# Patient Record
Sex: MALE | Race: WHITE | NOT HISPANIC OR LATINO | Employment: OTHER | URBAN - METROPOLITAN AREA
[De-identification: names, ages, dates, MRNs, and addresses within clinical notes are randomized per-mention and may not be internally consistent; named-entity substitution may affect disease eponyms.]

---

## 2022-01-18 NOTE — PROGRESS NOTES
[facility] Dermatology Note  Encounter Date: Jan 19, 2022  Office Visit     Dermatology Problem List:  Last skin check 1/19/22, recommended yearly  1. NUB - right upper back, bx 1/19/22    Social history: Grew up in [place]. 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  Family history: Father may have had skin cancer.  ____________________________________________    Assessment & Plan:    # Sun damaged skin with solar lentigines: Chronic, stable.  - Recommend sunscreens SPF #30 or greater, protective clothing and avoidance of tanning beds.    # Benign skin findings including: seborrheic keratoses, cherry angioma, skin tags - minor, self limited problem  - No further intervention required. Patient to report changes.   - Patient reassured of the benign nature of these lesions.    # Multiple clinically benign nevi: Chronic, stable  - No further intervention required. Patient to report changes.   - Patient reassured of the benign nature of these lesions.    # Neoplasm of uncertain behavior on the right upper back. The differential diagnosis includes BCC vs lentigo vs other.   - See shave biopsy procedure note below.    Procedures Performed:   - Shave biopsy procedure note, location: right upper back. After discussion of benefits and risks including but not limited to bleeding, infection, scar, incomplete removal, recurrence, and non-diagnostic biopsy, written consent and photographs were obtained. The area was cleaned with isopropyl alcohol. 0.5mL of 1% lidocaine with epinephrine was injected to obtain adequate anesthesia of lesion. Shave biopsy at site performed. Hemostasis was achieved with aluminium chloride. Petrolatum ointment and a sterile dressing were applied. The patient tolerated the procedure and no complications were noted. The patient was provided with verbal and written post care instructions.     Follow-up: pending path results, 1 year  in-person for skin check, or earlier for new or changing lesions.    Staff and Scribe:     Scribe Disclosure:   I, oRsalinda Chavarria, am serving as a scribe to document services personally performed by this physician, Dr. Elizabeth Herrera, based on data collection and the provider's statements to me.     Provider Disclosure:   The documentation recorded by the scribe accurately reflects the services I personally performed and the decisions made by me.    Elizabeth Herrera MD    Department of Dermatology  [facility]: Phone: [phone], Fax:[phone]  [facility]: Phone: [phone], Fax: [phone]    ____________________________________________    CC: Derm Problem (FBSC, spot around neck, skin tag on back, dry patches on scalp, dad possibly had skin cancer)    HPI:  Mr. Dez Mullen is a(n) 61 year old male who presents today as a new patient for skin check.    He is new to our department. He has not seen a dermatologist prior. He has no history of skin cancer, atypical moles, or precancerous lesions to his knowledge.    Self referred.    Today, Dez is accompanied by his wife. Dez notes concern of a spot on the neck, a lesion on the back, a lesion that patient picks off then grows back that is slightly elevated, and dry patches on the scalp.    Patient is otherwise feeling well, without additional skin concerns.    Labs Reviewed:  N/A    Physical Exam:  Vitals: There were no vitals taken for this visit.  SKIN: Total skin excluding the undergarment areas was performed. The exam included the head/face, neck, both arms, chest, back, abdomen, buttocks, both legs, digits and/or nails. Declined genital exam.  - Rendon Type II  - Scattered brown macules on sun exposed areas.  - There are dome shaped bright red papules on the trunk.   - Multiple regular brown pigmented macules and  papules are identified on the trunk and extremities.   - There are waxy stuck on tan to brown papules on the trunk.  - Right upper back: 3mm brown macule with some clod and clod pigmentation  - There are skin colored pedunculated papules around the neck.  - No other lesions of concern on areas examined.     Medications:  Current Outpatient Medications   Medication     MULTIPLE VITAMIN PO     Omega-3 Fatty Acids (OMEGA 3 PO)     No current facility-administered medications for this visit.      Past Medical History:   Patient Active Problem List   Diagnosis   (none) - all problems resolved or deleted     No past medical history on file.     CC Referred Self, MD  No address on file on close of this encounter.

## 2022-01-19 ENCOUNTER — OFFICE VISIT (OUTPATIENT)
Dept: DERMATOLOGY | Facility: CLINIC | Age: 62
End: 2022-01-19
Payer: MEDICARE

## 2022-01-19 DIAGNOSIS — L57.8 SUN-DAMAGED SKIN: ICD-10-CM

## 2022-01-19 DIAGNOSIS — L82.1 SEBORRHEIC KERATOSIS: ICD-10-CM

## 2022-01-19 DIAGNOSIS — D22.9 MULTIPLE BENIGN NEVI: ICD-10-CM

## 2022-01-19 DIAGNOSIS — L81.4 SOLAR LENTIGO: ICD-10-CM

## 2022-01-19 DIAGNOSIS — D48.5 NEOPLASM OF UNCERTAIN BEHAVIOR OF SKIN: Primary | ICD-10-CM

## 2022-01-19 DIAGNOSIS — L91.8 SKIN TAG: ICD-10-CM

## 2022-01-19 DIAGNOSIS — D18.01 CHERRY ANGIOMA: ICD-10-CM

## 2022-01-19 PROCEDURE — 99203 OFFICE O/P NEW LOW 30 MIN: CPT | Mod: 25 | Performed by: DERMATOLOGY

## 2022-01-19 PROCEDURE — 88305 TISSUE EXAM BY PATHOLOGIST: CPT | Performed by: DERMATOLOGY

## 2022-01-19 PROCEDURE — 11102 TANGNTL BX SKIN SINGLE LES: CPT | Performed by: DERMATOLOGY

## 2022-01-19 NOTE — LETTER
1/19/2022         RE: Dez Mullen  [address] [facility] [address]        Dear Colleague,    Thank you for referring your patient, Dez Mullen, to the [facility]. Please see a copy of my visit note below.    [facility] Dermatology Note  Encounter Date: Jan 19, 2022  Office Visit     Dermatology Problem List:  Last skin check 1/19/22, recommended yearly  1. NUB - right upper back, bx 1/19/22    Social history: Grew up in [place]. 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  Family history: Father may have had skin cancer.  ____________________________________________    Assessment & Plan:    # Sun damaged skin with solar lentigines: Chronic, stable.  - Recommend sunscreens SPF #30 or greater, protective clothing and avoidance of tanning beds.    # Benign skin findings including: seborrheic keratoses, cherry angioma, skin tags - minor, self limited problem  - No further intervention required. Patient to report changes.   - Patient reassured of the benign nature of these lesions.    # Multiple clinically benign nevi: Chronic, stable  - No further intervention required. Patient to report changes.   - Patient reassured of the benign nature of these lesions.    # Neoplasm of uncertain behavior on the right upper back. The differential diagnosis includes BCC vs lentigo vs other.   - See shave biopsy procedure note below.    Procedures Performed:   - Shave biopsy procedure note, location: right upper back. After discussion of benefits and risks including but not limited to bleeding, infection, scar, incomplete removal, recurrence, and non-diagnostic biopsy, written consent and photographs were obtained. The area was cleaned with isopropyl alcohol. 0.5mL of 1% lidocaine with epinephrine was injected to obtain adequate anesthesia of lesion. Shave biopsy at site performed. Hemostasis was achieved with  aluminium chloride. Petrolatum ointment and a sterile dressing were applied. The patient tolerated the procedure and no complications were noted. The patient was provided with verbal and written post care instructions.     Follow-up: pending path results, 1 year in-person for skin check, or earlier for new or changing lesions.    Staff and Scribe:     Scribe Disclosure:   I, Rosalindamary Chavarria, am serving as a scribe to document services personally performed by this physician, Dr. Elizabeth Herrera, based on data collection and the provider's statements to me.     Provider Disclosure:   The documentation recorded by the scribe accurately reflects the services I personally performed and the decisions made by me.    Elizabeth Herrera MD    Department of Dermatology  [facility]: Phone: [phone], Fax:[phone]  [facility]: Phone: [phone], Fax: [phone]    ____________________________________________    CC: Derm Problem (FBSC, spot around neck, skin tag on back, dry patches on scalp, dad possibly had skin cancer)    HPI:  Mr. Dez Mullen is a(n) 61 year old male who presents today as a new patient for skin check.    He is new to our department. He has not seen a dermatologist prior. He has no history of skin cancer, atypical moles, or precancerous lesions to his knowledge.    Self referred.    Today, Dez is accompanied by his wife. Dez notes concern of a spot on the neck, a lesion on the back, a lesion that patient picks off then grows back that is slightly elevated, and dry patches on the scalp.    Patient is otherwise feeling well, without additional skin concerns.    Labs Reviewed:  N/A    Physical Exam:  Vitals: There were no vitals taken for this visit.  SKIN: Total skin excluding the undergarment areas was performed. The exam included the head/face, neck, both arms,  chest, back, abdomen, buttocks, both legs, digits and/or nails. Declined genital exam.  - Rendon Type II  - Scattered brown macules on sun exposed areas.  - There are dome shaped bright red papules on the trunk.   - Multiple regular brown pigmented macules and papules are identified on the trunk and extremities.   - There are waxy stuck on tan to brown papules on the trunk.  - Right upper back: 3mm brown macule with some clod and clod pigmentation  - There are skin colored pedunculated papules around the neck.  - No other lesions of concern on areas examined.     Medications:  Current Outpatient Medications   Medication     MULTIPLE VITAMIN PO     Omega-3 Fatty Acids (OMEGA 3 PO)     No current facility-administered medications for this visit.      Past Medical History:   Patient Active Problem List   Diagnosis   (none) - all problems resolved or deleted     No past medical history on file.     CC Referred Self, MD  No address on file on close of this encounter.       Again, thank you for allowing me to participate in the care of your patient.        Sincerely,        Elizabeth Herrera MD

## 2022-01-19 NOTE — NURSING NOTE
The following medication was given:     MEDICATION:  Lidocaine with epinephrine 1% 1:[id number]  ROUTE: [place]  SITE: see procedure note  DOSE: see procedure note  LOT #: -[id number]  : Crisira  EXPIRATION DATE: 6/1/2022  NDC#: 2069-1852-99     Was there drug waste? Yes    Multi-dose vial: Yes    Dominga Nolen LPN  January 19, 2022

## 2022-01-19 NOTE — PATIENT INSTRUCTIONS

## 2022-01-19 NOTE — NURSING NOTE
Dez Mullen's goals for this visit include:   Chief Complaint   Patient presents with     Derm Problem     FBSC, spot around neck, skin tag on back, dry patches on scalp, dad possibly had skin cancer       He requests these members of his care team be copied on today's visit information: no    PCP: No Ref-Primary, Physician    Referring Provider:  Referred Self, MD  No address on file    There were no vitals taken for this visit.    Do you need any medication refills at today's visit? No    Dominga Nolen LPN

## 2022-01-22 LAB
PATH REPORT.COMMENTS IMP SPEC: ABNORMAL
PATH REPORT.COMMENTS IMP SPEC: ABNORMAL
PATH REPORT.COMMENTS IMP SPEC: YES
PATH REPORT.FINAL DX SPEC: ABNORMAL
PATH REPORT.GROSS SPEC: ABNORMAL
PATH REPORT.MICROSCOPIC SPEC OTHER STN: ABNORMAL
PATH REPORT.RELEVANT HX SPEC: ABNORMAL

## 2022-01-25 ENCOUNTER — TELEPHONE (OUTPATIENT)
Dept: DERMATOLOGY | Facility: CLINIC | Age: 62
End: 2022-01-25
Payer: MEDICARE

## 2022-01-25 NOTE — TELEPHONE ENCOUNTER
Elzbieta Wick RN   1/25/2022  2:23 PM CST Back to Top        Results reviewed with Don.  He opted for ED&C.  Appt scheduled 1/27/22.  GIANNA Batres RN   1/25/2022  2:13 PM CST         Pt called, No answer.  does not identify pt. Left general message for pt to call the [facility] back at [phone]. ..Merry Herrera MD   1/25/2022 12:10 PM CST         Please let patient know biopsy showed BCC. This is his first skin cancer. Options for treatment are 1) efudex 5% cream once daily x6 weeks with recheck to ensure adequately treated in 3 months or 2) ED&C in office. See which patient prefers. If efudex, send to RN to place rx.     Next skin check in 6 months.     Final Diagnosis   A. 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

## 2022-01-25 NOTE — TELEPHONE ENCOUNTER
ED&C Scheduling    Explain diagnosis: yes    Explain procedure and outcome of wound/scar will appear like a gray-kristan cigar burn: Yes    Inform patient no tissue is sent to pathologist for testing margins: Yes    Inform patient no submerging the area in a bathtub, hot tub, lake or swimming pool until completely healed: Yes    After care explanation of Vaseline and bandage until completely healed: Yes    Elzbieta Wick RN

## 2022-01-27 ENCOUNTER — TELEPHONE (OUTPATIENT)
Dept: DERMATOLOGY | Facility: CLINIC | Age: 62
End: 2022-01-27

## 2022-01-27 NOTE — TELEPHONE ENCOUNTER
[facility] Call Center    Phone Message    May a detailed message be left on voicemail: yes     Reason for Call: Appointment Intake    Referring Provider Name: SPENCER  Diagnosis and/or Symptoms: ED&C - BCC right upper back    Pt is ill and needed to cancel Appt for today 01/27 and will need to reschedule this Appt     Please call Pt's spouse to reschedule [place] [phone]  Thanks    Action Taken: Message routed to:  [facility] Derm    Travel Screening: Not Applicable

## 2022-01-27 NOTE — TELEPHONE ENCOUNTER
Patient has been rescheduled to 2/3/2022.   CVA (cerebrovascular accident) CVA (cerebrovascular accident) CVA (cerebrovascular accident)

## 2022-02-03 ENCOUNTER — OFFICE VISIT (OUTPATIENT)
Dept: DERMATOLOGY | Facility: CLINIC | Age: 62
End: 2022-02-03
Payer: MEDICARE

## 2022-02-03 DIAGNOSIS — C44.519 BCC (BASAL CELL CARCINOMA), TRUNK: Primary | ICD-10-CM

## 2022-02-03 PROCEDURE — 99213 OFFICE O/P EST LOW 20 MIN: CPT | Performed by: DERMATOLOGY

## 2022-02-03 RX ORDER — ONDANSETRON 8 MG/1
TABLET, ORALLY DISINTEGRATING ORAL
COMMUNITY
Start: 2021-04-13

## 2022-02-03 RX ORDER — FLUOROURACIL 50 MG/G
CREAM TOPICAL
Qty: 40 G | Refills: 0 | Status: SHIPPED | OUTPATIENT
Start: 2022-02-03

## 2022-02-03 RX ORDER — CEFUROXIME AXETIL 250 MG/1
TABLET ORAL
COMMUNITY

## 2022-02-03 RX ORDER — UBROGEPANT 50 MG/1
TABLET ORAL
COMMUNITY
Start: 2022-01-17

## 2022-02-03 NOTE — LETTER
2/3/2022         RE: [facility]  [address] [facility] [facility] [address]        Dear Colleague,    Thank you for referring your patient, Dez Mullen, to the [facility]. Please see a copy of my visit note below.    [facility] Dermatology Note  Encounter Date: Feb 3, 2022  Office Visit     Dermatology Problem List:  Last skin check 1/19/22, recommended yearly  1. History of NMSC  - BCC right upper back, s/p bx 1/19/22, Efudex initiated 2/3/22     Social history: Grew up in [place]. 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  Family history: Father may have had skin cancer.  _______________________________    Assessment & Plan:     # Biopsy proven BCC, right upper back.  - Patient initially presented for ED&C treatment today. We reviewed treatment options of ED&C and Efudex. Risks and benefits of both options explained in detail. Patient elects to pursue Efudex.  - Initiate Efudex 5% cream applied to the right upper back once daily x 6 weeks. Discussed that patient should expect redness, blistering and scabbing as well as time course discussed. Informed patient to keep away from children/pets and to wash hands after application.    Procedures Performed:   None.    Follow-up: 3 months for recheck, sooner for concerns.    Staff and Scribe:     Scribe Disclosure:   I, Rosalinda Chavarria, am serving as a scribe to document services personally performed by this physician, Dr. Elizabeth Herrera, based on data collection and the provider's statements to me.     Provider Disclosure:   The documentation recorded by the scribe accurately reflects the services I personally performed and the decisions made by me.    Elizabeth Herrera MD    Department of Dermatology  [facility]: Phone: [phone], Fax:[phone]  [facility]  [facility]: Phone: [phone], Fax: [phone]    ____________________________________________    CC: Procedure (ED&C-right upper back)    HPI:  Mr. Dez Mullen is a(n) 61 year old male who presents today as a return patient for BCC treatment.    Last seen 1/9/22. A biopsy determined a BCC on the right upper back.    Today, Dez presents for ED&C treatment of BCC on the right upper back. He has a lot of questions and is hesitant for the procedure.    Patient is otherwise feeling well, without additional skin concerns.     Labs Reviewed:  N/A    Physical Exam:  Vitals: There were no vitals taken for this visit.  SKIN: Focused examination of the upper back was performed.  - Right upper back: pink macule consistent with scar  - No other lesions of concern on areas examined.     Medications:  Current Outpatient Medications   Medication     fluorouracil (EFUDEX) 5 % external cream     MULTIPLE VITAMIN PO     Omega-3 Fatty Acids (OMEGA 3 PO)     ondansetron (ZOFRAN-ODT) 8 MG ODT tab     SUMAtriptan (IMITREX STATDOSE) 6 MG/0.5ML pen injector kit     UBRELVY 50 MG tablet     No current facility-administered medications for this visit.      Past Medical History:   Patient Active Problem List   Diagnosis   (none) - all problems resolved or deleted     No past medical history on file.    CC No referring provider defined for this encounter. on close of this encounter.          Again, thank you for allowing me to participate in the care of your patient.        Sincerely,        Elizabeth Herrera MD

## 2022-02-03 NOTE — NURSING NOTE
Dez Mullen's goals for this visit include:   Chief Complaint   Patient presents with     Procedure     ED&C-right upper back       He requests these members of his care team be copied on today's visit information:     PCP: No Ref-Primary, Physician    Referring Provider:  No referring provider defined for this encounter.    There were no vitals taken for this visit.    Do you need any medication refills at today's visit? Cheyenne Lenz on 2/3/2022 at 1:59 PM

## 2022-02-03 NOTE — PROGRESS NOTES
[facility] Dermatology Note  Encounter Date: Feb 3, 2022  Office Visit     Dermatology Problem List:  Last skin check 1/19/22, recommended yearly  1. History of NMSC  - BCC right upper back, s/p bx 1/19/22, Efudex initiated 2/3/22     Social history: Grew up in [place]. 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  Family history: Father may have had skin cancer.  _______________________________    Assessment & Plan:     # Biopsy proven BCC, right upper back.  - Patient initially presented for ED&C treatment today. We reviewed treatment options of ED&C and Efudex. Risks and benefits of both options explained in detail. Patient elects to pursue Efudex.  - Initiate Efudex 5% cream applied to the right upper back once daily x 6 weeks. Discussed that patient should expect redness, blistering and scabbing as well as time course discussed. Informed patient to keep away from children/pets and to wash hands after application.    Procedures Performed:   None.    Follow-up: 3 months for recheck, sooner for concerns.    Staff and Scribe:     Scribe Disclosure:   I, Rosalinda Chavarria, am serving as a scribe to document services personally performed by this physician, Dr. Elizabeth Herrera, based on data collection and the provider's statements to me.     Provider Disclosure:   The documentation recorded by the scribe accurately reflects the services I personally performed and the decisions made by me.    Elizabeth Herrera MD    Department of Dermatology  [facility]: Phone: [phone], Fax:[phone]  [facility]: Phone: [phone], Fax: [phone]    ____________________________________________    CC: Procedure (ED&C-right upper back)    HPI:  Mr. Dez Mullen is a(n) 61 year old male who presents today as a return patient for BCC  treatment.    Last seen 1/9/22. A biopsy determined a BCC on the right upper back.    Today, Dez presents for ED&C treatment of BCC on the right upper back. He has a lot of questions and is hesitant for the procedure.    Patient is otherwise feeling well, without additional skin concerns.     Labs Reviewed:  N/A    Physical Exam:  Vitals: There were no vitals taken for this visit.  SKIN: Focused examination of the upper back was performed.  - Right upper back: pink macule consistent with scar  - No other lesions of concern on areas examined.     Medications:  Current Outpatient Medications   Medication     fluorouracil (EFUDEX) 5 % external cream     MULTIPLE VITAMIN PO     Omega-3 Fatty Acids (OMEGA 3 PO)     ondansetron (ZOFRAN-ODT) 8 MG ODT tab     SUMAtriptan (IMITREX STATDOSE) 6 MG/0.5ML pen injector kit     UBRELVY 50 MG tablet     No current facility-administered medications for this visit.      Past Medical History:   Patient Active Problem List   Diagnosis   (none) - all problems resolved or deleted     No past medical history on file.    CC No referring provider defined for this encounter. on close of this encounter.

## 2022-02-03 NOTE — PATIENT INSTRUCTIONS
Patient currently hospitalized and received cath today 12/1/20.      Wound Care:  Electrodesiccation and Curettage     I will experience scar, altered skin color, bleeding, swelling, pain, crusting and redness. I may experience altered sensation. Risks are excessive bleeding, infection, muscle weakness, thick (hypertrophic or keloidal) scar, and recurrence,. A second procedure may be recommended to obtain the best cosmetic or functional result.    What is electrodesiccation and curettage ?    Scraping off tissue (curettage)    Destroy tissue using electric current or cautery (electrodessication)    How do I perform wound care?    Keep dressing in place for two days. You may shower with the dressing in place(do not get wet)    After 2 days, wash hands and remove dressing. Clean wound with cotton-swab soaked in hydrogen peroxide to remove drainage and crust    Put on a thick layer of Vaseline on the wound using a cotton-swab     Cover the wound with a Band-AidTM to protect from dust and tight clothing    If wound is draining before two days, change your dressing as described above sooner    During wound care, do not allow the area to dry out or form a scab    What do I need?    Hydrogen peroxide     Cotton-swabs     Vaseline or petroleum jelly     Band-AidsTM or dressing supplies as needed     When should I call the doctor?  [facility]: [phone]  [facility]: [phone]  For urgent needs outside of business hours call the [facility] at [phone] and ask for the dermatology resident on call

## 2022-02-18 ENCOUNTER — TELEPHONE (OUTPATIENT)
Dept: DERMATOLOGY | Facility: CLINIC | Age: 62
End: 2022-02-18
Payer: MEDICARE

## 2022-02-18 NOTE — TELEPHONE ENCOUNTER
[facility] Call Center    Phone Message    May a detailed message be left on voicemail: yes     Reason for Call: Other: Pts wife called. Bought an infrared haeating pad. Wondering if ok to use on shoulder with BCC.      Action Taken: Message routed to:  Adult Clinics: Dermatology p [id number]    Travel Screening: Not Applicable

## 2022-02-21 NOTE — TELEPHONE ENCOUNTER
Left message to call back clinic regarding patients message.   Hanna Watt, [facility] on 2/21/2022 at 3:37 PM

## 2022-02-24 NOTE — TELEPHONE ENCOUNTER
I left a message for patient to call [facility].  Elzbieta Wick RN

## 2022-03-01 ENCOUNTER — TELEPHONE (OUTPATIENT)
Dept: CARDIOLOGY | Facility: CLINIC | Age: 62
End: 2022-03-01
Payer: MEDICARE

## 2022-03-01 NOTE — TELEPHONE ENCOUNTER
Attempted to reach patient for pre visit. Call was dropped and then he did not answer. Message left to return call for pre visit Dr Al next week.     Talya Dunaway RN  Medical Speciality Care Coordinator  [facility] [place]  Phone: [phone]

## 2022-03-03 ENCOUNTER — TELEPHONE (OUTPATIENT)
Dept: CARDIOLOGY | Facility: CLINIC | Age: 62
End: 2022-03-03
Payer: MEDICARE

## 2022-03-03 NOTE — TELEPHONE ENCOUNTER
Called patient and left message for previsit. Asked for callback before next tuesdays appt.     Sterling Avilez, EMT  Clinic Support  [facility]    [phone]    Employed by [facility] [employer]

## 2022-03-03 NOTE — TELEPHONE ENCOUNTER
[facility] Call Center    Phone Message    May a detailed message be left on voicemail: yes     Reason for Call: Other: Pt is returning RN call as he stated he missed a call to discuss a pre visit. Plese reach out to pt to discuss     Action Taken: Message routed to:  [facility] Cardio    Travel Screening: Not Applicable

## 2022-03-04 NOTE — TELEPHONE ENCOUNTER
Left message asking patient to call back and complete a pre visit phone call. No cardiology records found in chart or Care Everywhere.       DBaGIANNA boston

## 2022-03-30 ENCOUNTER — PATIENT OUTREACH (OUTPATIENT)
Dept: CARDIOLOGY | Facility: CLINIC | Age: 62
End: 2022-03-30
Payer: MEDICARE

## 2022-03-30 NOTE — TELEPHONE ENCOUNTER
----- Message from Kaylee Moulton sent at 3/29/2022  9:42 AM CDT -----  Hi, I was rescheduling this patient and he stated that he had having arrhythmias and he was concerned about for often they are happening. I had to reschedule his appointment from 4/20 with Manuel to 5/4 so he is not happy.     Any way someone could give him a call about the arrhythmias ?    Thanks,   Kaylee.

## 2022-03-30 NOTE — TELEPHONE ENCOUNTER
Called and spoke to patient. He states he has no outside records, He was in the [employer] and had EKG's yearly as part of his physical and said they were always sinus lalito with HR of 48-52.   Offered appointment for April 5 th with Dr Al. Reviewed clinic location . Asked to come 15 min early for EKG.     Talya Dunaway RN  Medical Speciality Care Coordinator  [facility] [employer]  Phone: [phone]

## 2022-04-05 ENCOUNTER — OFFICE VISIT (OUTPATIENT)
Dept: CARDIOLOGY | Facility: CLINIC | Age: 62
End: 2022-04-05
Payer: MEDICARE

## 2022-04-05 ENCOUNTER — ANCILLARY PROCEDURE (OUTPATIENT)
Dept: CARDIOLOGY | Facility: CLINIC | Age: 62
End: 2022-04-05
Attending: INTERNAL MEDICINE
Payer: MEDICARE

## 2022-04-05 VITALS
SYSTOLIC BLOOD PRESSURE: 119 MMHG | BODY MASS INDEX: 31.57 KG/M2 | OXYGEN SATURATION: 95 % | DIASTOLIC BLOOD PRESSURE: 82 MMHG | WEIGHT: 232.8 LBS | HEART RATE: 66 BPM

## 2022-04-05 DIAGNOSIS — R00.2 PALPITATIONS: ICD-10-CM

## 2022-04-05 DIAGNOSIS — R00.2 PALPITATIONS: Primary | ICD-10-CM

## 2022-04-05 PROCEDURE — 93000 ELECTROCARDIOGRAM COMPLETE: CPT | Performed by: INTERNAL MEDICINE

## 2022-04-05 PROCEDURE — 99204 OFFICE O/P NEW MOD 45 MIN: CPT | Performed by: INTERNAL MEDICINE

## 2022-04-05 PROCEDURE — 93245 EXT ECG>7D<15D REC SCAN A/R: CPT | Performed by: INTERNAL MEDICINE

## 2022-04-05 RX ORDER — OXYCODONE HYDROCHLORIDE 10 MG/1
TABLET ORAL
COMMUNITY
Start: 2021-01-26

## 2022-04-05 NOTE — NURSING NOTE
Dez Mullen's goals for this visit include:   Chief Complaint   Patient presents with     New Patient     self referred      Palpitations     noticed since February        He requests these members of his care team be copied on today's visit information: no     PCP: No Ref-Primary, Physician    Referring Provider:  No referring provider defined for this encounter.    /82 (BP Location: Left arm, Patient Position: Chair, Cuff Size: Adult Large)   Pulse 66   Wt 105.6 kg (232 lb 12.8 oz)   SpO2 95%   BMI 31.57 kg/m      Do you need any medication refills at today's visit? No     Elzbieta MATTA MA   Cardiology Team  [facility]

## 2022-04-05 NOTE — PATIENT INSTRUCTIONS
The following is a summary of your office visit today:        Diagnosis: palpitations      Recommendations:  14 day patch monitor    Follow up: after above        If you have had any blood work, imaging or other testing completed we will be in touch within 1-2 weeks regarding the results. If you have any questions, concerns or need to schedule a follow up, please contact us at [phone] If you are needing refills please contact your pharmacy. For urgent after hour care please call the [facility] at [phone] or the [facility] at [phone] and ask to speak to the cardiologist on call.    It was a pleasure meeting with you today. Please let us know if there is anything else we can do for you so that we can be sure you are leaving completely satisfied with your care experience.     Your Cardiology Team at [facility]  Phone: [phone] Fax: [phone]  RN Care Coordinators: Gregorio  Support Staff: Sterling Ruff            HOW TO CHECK YOUR BLOOD PRESSURE AT HOME:     Avoid eating, smoking, and exercising for at least 30 minutes before taking a reading.    Be sure you have taken your BP medication at least 2-3 hours before you check it.     Sit quietly for 10 minutes before a reading.     Sit in a chair with your feet flat on the floor. Rest your  arm on a table so that the arm cuff is at the same level as your heart.    Remain still during the reading.    Record your blood pressure and pulse in a log and bring to your next appointment.

## 2022-04-05 NOTE — NURSING NOTE
Patient has been prescribed a ZioPatch holter for 14 days.  Patient was instructed regarding the indication, function, care and prompt return of the ZioPatch holter monitor. The monitor, with S/N [id number],  was placed on the patient with instructions regarding care of the skin, electrodes, and monitor, as well as documentation in the patient diary. Patient demonstrated understanding of this information and agreed to call [employer] with further questions or concerns.    Talya Dunaway RN
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
## 2022-05-10 ENCOUNTER — TELEPHONE (OUTPATIENT)
Dept: CARDIOLOGY | Facility: CLINIC | Age: 62
End: 2022-05-10
Payer: MEDICARE

## 2022-05-10 NOTE — TELEPHONE ENCOUNTER
----- Message from Mahi Al MD sent at 5/4/2022  2:41 PM CDT -----  Ziopatch results: Symptoms mainly correlated with sinus rhythm. He did have PACs but only 2%.   No significant arrhythmias. Please contact patient and offer reassurance.    Mahi Al MD  5/4/2022  2:40 PM    Called and spoke to patient. Given above information. He said he has no symptoms. Did note things in his diary. No follow up needed at this time.     Talya Dunaway RN  Cardiology Care Coordinator  [facility]  Phone: [phone]

## 2022-07-25 ENCOUNTER — OFFICE VISIT (OUTPATIENT)
Dept: DERMATOLOGY | Facility: CLINIC | Age: 62
End: 2022-07-25
Payer: MEDICARE

## 2022-07-25 DIAGNOSIS — L81.4 SOLAR LENTIGO: ICD-10-CM

## 2022-07-25 DIAGNOSIS — D22.9 MULTIPLE BENIGN NEVI: ICD-10-CM

## 2022-07-25 DIAGNOSIS — C44.519 BCC (BASAL CELL CARCINOMA), TRUNK: Primary | ICD-10-CM

## 2022-07-25 DIAGNOSIS — L82.1 SEBORRHEIC KERATOSES: ICD-10-CM

## 2022-07-25 DIAGNOSIS — D18.01 CHERRY ANGIOMA: ICD-10-CM

## 2022-07-25 PROCEDURE — 99212 OFFICE O/P EST SF 10 MIN: CPT | Mod: 25 | Performed by: DERMATOLOGY

## 2022-07-25 PROCEDURE — 17263 DSTRJ MAL LES T/A/L 2.1-3.0: CPT | Performed by: DERMATOLOGY

## 2022-07-25 RX ORDER — LASMIDITAN 50 MG/1
TABLET ORAL
COMMUNITY
Start: 2022-07-20

## 2022-07-25 ASSESSMENT — PAIN SCALES - GENERAL: PAINLEVEL: NO PAIN (0)

## 2022-07-25 NOTE — PATIENT INSTRUCTIONS
Wound Care:  Electrodesiccation and Curettage     I will experience scar, altered skin color, bleeding, swelling, pain, crusting and redness. I may experience altered sensation. Risks are excessive bleeding, infection, muscle weakness, thick (hypertrophic or keloidal) scar, and recurrence,. A second procedure may be recommended to obtain the best cosmetic or functional result.    What is electrodesiccation and curettage ?  Scraping off tissue (curettage)  Destroy tissue using electric current or cautery (electrodessication)    How do I perform wound care?  Keep dressing in place for two days. You may shower with the dressing in place(do not get wet)  After 2 days, wash hands and remove dressing. Clean wound with cotton-swab soaked in hydrogen peroxide to remove drainage and crust  Put on a thick layer of Vaseline on the wound using a cotton-swab   Cover the wound with a Band-AidTM to protect from dust and tight clothing  If wound is draining before two days, change your dressing as described above sooner  During wound care, do not allow the area to dry out or form a scab    What do I need?  Hydrogen peroxide   Cotton-swabs   Vaseline or petroleum jelly   Band-AidsTM or dressing supplies as needed     When should I call the doctor?  [facility]: [phone]  [facility]: [phone]  For urgent needs outside of business hours call the [facility] at [phone] and ask for the dermatology resident on call

## 2022-07-25 NOTE — NURSING NOTE
Dez Mullen's chief complaint for this visit includes:  Chief Complaint   Patient presents with     Skin Check     Spot check: Used Efudex for the right upper back - follow up on this.  Lump on right upper arm is of concern. Hx of NMSC.      PCP: No Ref-Primary, Physician    Referring Provider:  Antoni Spencer MD  [address] [address]    There were no vitals taken for this visit.  No Pain (0)        Allergies   Allergen Reactions     Promethazine Other (See Comments)     PN: muscle stiffness, Dystonic movement       Propranolol Swelling     Tongue swelling     Adhesive Tape Other (See Comments)     Pt has had reactions to the clear/gray plastic tape at [place] in the past.      Other Drug Allergy (See Comments) Other (See Comments)     PN: muscle stiffness     Diphenhydramine Anxiety and Other (See Comments)     Pt reports got in combo with zyprexa and became very uncomfortable and restless.        Olanzapine Anxiety and Other (See Comments)     PN: muscle stiffness, Dystonic movement  Pt reports got in combo with zyprexa and became very uncomfortable and restless.            Do you need any medication refills at today's visit? No    Elizabeth Carpenter, CMA

## 2022-07-25 NOTE — PROGRESS NOTES
[facility] Dermatology Note  Encounter Date: Jul 25, 2022  Office Visit     Dermatology Problem List:  Last skin check 7/25/22  1. History of NMSC  - BCC, right upper back, s/p bx 1/19/22, Efudex initiated 2/3/22, s/p ED&C 7/25/22     Social history: Grew up in [place]. 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  Family history: Father may have had skin cancer.  _______________________________     Assessment & Plan:      # Biopsy proven BCC, right upper back s/p Efudex once daily x 6 weeks. Still with residual lesion remaining today. Reviewed treatment options including reinitiation of topicals and ED&C. After discussion, patient agreeable to treating with ED&C.  - See procedure note.    # Benign lesions: Multiple benign nevi, solar lentigos, seborrheic keratoses, cherry angiomas. Explained to patient benign nature of lesion. No treatment is necessary at this time unless the lesion changes or becomes symptomatic.   - ABCDs of melanoma were discussed and self skin checks were advised.  - Sun precaution was advised including the use of sun screens of SPF 30 or higher, sun protective clothing, and avoidance of tanning beds.    Procedures Performed:   See procedure note.    Follow-up: 6 months in-person, or earlier for new or changing lesions    Staff and Scribe:     Scribe Disclosure:   I, Rock Whiting, am serving as a scribe to document services personally performed by this physician, Dr. Antoni Spencer, based on data collection and the provider's statements to me.     Provider Disclosure:   The documentation recorded by the scribe accurately reflects the services I personally performed and the decisions made by me.    Antoni Spencer MD    Department of Dermatology  [facility]: Phone: [phone], Fax:[phone]  [facility]: Phone: [phone] Fax: [phone]  ____________________________________________    CC: Skin Check (Spot check: Used Efudex for the right upper back - follow up on this.  Lump on right upper arm is of concern. Hx of NMSC. )    HPI:  Mr. Dez Mullen is a(n) 61 year old male who presents today as a return patient for a spot check.    Last seen 2/3/22 for follow up after biopsy results. Initiated on Efudex for treatment.    Today, he notes he used Efudex once daily for 6 weeks, as prescribed. He did not have a significant reaction. He also has a spot on the upper arm he would like to have examined.    The patient otherwise denies any new or concerning lesions. No bleeding, painful, pruritic, or changing lesions. Health otherwise stable. No other skin concerns.      Labs Reviewed:  N/A    Physical Exam:  Vitals: There were no vitals taken for this visit.  SKIN: Waist up skin exam performed, including the face, scalp, back, arms, chest, and abdomen, was performed.  - Right upper back: pink papule with peripheral granular pigment clods  - There are dome shaped bright red papules on the trunk and extremities.   - Multiple regular brown pigmented macules and papules are identified on the trunk and extremities.   - Scattered brown macules on sun exposed areas.  - There are waxy stuck on tan to brown papules on the trunk and extremities.     - No other lesions of concern on areas examined.     Medications:  Current Outpatient Medications   Medication     fluorouracil (EFUDEX) 5 % external cream     MULTIPLE VITAMIN PO     Omega-3 Fatty Acids (OMEGA 3 PO)     ondansetron (ZOFRAN-ODT) 8 MG ODT tab     oxyCODONE IR (ROXICODONE) 10 MG tablet     SUMAtriptan (IMITREX STATDOSE) 6 MG/0.5ML pen injector kit     UBRELVY 50 MG tablet     No current facility-administered medications for this visit.      Past Medical History:   Patient Active Problem List   Diagnosis   (none) - all problems resolved or deleted     No past  medical history on file.

## 2022-07-25 NOTE — PROCEDURES
Dermatology Procedure Note: Electrodesiccation and Curettage    PREOPERATIVE DIAGNOSIS: BCC    POSTOPERATIVE DIAGNOSIS: same    LOCATION: right upper back    SIZE: 1.5 x 1.2 cm     Treatment options including electrodessiccation and curettage (ED and C), excision and topicals were reviewed.  The expected cure rates, healing times and anticipated scars of each option were discussed and the patient elects to proceed with ED and C.     The risks and benefits of the procedure were described to the patient.  These include but are not limited to bleeding, infection, scar, incomplete removal, and recurrence. Written informed consent was obtained. Time-out was performed. The above site was cleansed with and injected with 2 mL1% lidocaine with epinephrine. Once anesthesia was obtained, the site was prepped with Alcohol. The lesion was curetted with  in 3 directions with a 4 mm margin and this was followed by electrodessication.  This process was repeated three times. The defect measured 2.3 x 2.0 cm. Vaseline and a bandage were applied to the wound. The patient tolerated the procedure well and was given post care instructions.    Clinical Follow-up: 6 months for a skin check    Staff Involved:  Scribe/Staff    Scribe Disclosure:   I, Rock Whiting, am serving as a scribe to document services personally performed by this physician, Dr. Antoni Spencer, based on data collection and the provider's statements to me.       Provider Disclosure:   The documentation recorded by the scribe accurately reflects the services I personally performed and the decisions made by me.    Antoni Spencer MD    Department of Dermatology  [facility]: Phone: [phone], Fax:[phone]  [facility]: Phone: [phone] Fax: [phone]

## 2022-07-25 NOTE — LETTER
7/25/2022         RE: Dez Mullen  [address] [place] [facility] [address]        Dear Colleague,    Thank you for referring your patient, Dez Mullen, to the [facility]. Please see a copy of my visit note below.    [facility] Dermatology Note  Encounter Date: Jul 25, 2022  Office Visit     Dermatology Problem List:  Last skin check 7/25/22  1. History of NMSC  - BCC, right upper back, s/p bx 1/19/22, Efudex initiated 2/3/22, s/p ED&C 7/25/22     Social history: Grew up in [place]. 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  Family history: Father may have had skin cancer.  _______________________________     Assessment & Plan:      # Biopsy proven BCC, right upper back s/p Efudex once daily x 6 weeks. Still with residual lesion remaining today. Reviewed treatment options including reinitiation of topicals and ED&C. After discussion, patient agreeable to treating with ED&C.  - See procedure note.    # Benign lesions: Multiple benign nevi, solar lentigos, seborrheic keratoses, cherry angiomas. Explained to patient benign nature of lesion. No treatment is necessary at this time unless the lesion changes or becomes symptomatic.   - ABCDs of melanoma were discussed and self skin checks were advised.  - Sun precaution was advised including the use of sun screens of SPF 30 or higher, sun protective clothing, and avoidance of tanning beds.    Procedures Performed:   See procedure note.    Follow-up: 6 months in-person, or earlier for new or changing lesions    Staff and Scribe:     Scribe Disclosure:   I, Rock Whiting, am serving as a scribe to document services personally performed by this physician, Dr. Antoni Spencer, based on data collection and the provider's statements to me.     Provider Disclosure:   The documentation recorded by the scribe accurately reflects the services I personally performed and  the decisions made by me.    Antoni Spencer MD    Department of Dermatology  [facility]: Phone: [phone], Fax:[phone]  [facility]: Phone: [phone] Fax: [phone]  ____________________________________________    CC: Skin Check (Spot check: Used Efudex for the right upper back - follow up on this.  Lump on right upper arm is of concern. Hx of NMSC. )    HPI:  Mr. Dez Mullen is a(n) 61 year old male who presents today as a return patient for a spot check.    Last seen 2/3/22 for follow up after biopsy results. Initiated on Efudex for treatment.    Today, he notes he used Efudex once daily for 6 weeks, as prescribed. He did not have a significant reaction. He also has a spot on the upper arm he would like to have examined.    The patient otherwise denies any new or concerning lesions. No bleeding, painful, pruritic, or changing lesions. Health otherwise stable. No other skin concerns.      Labs Reviewed:  N/A    Physical Exam:  Vitals: There were no vitals taken for this visit.  SKIN: Waist up skin exam performed, including the face, scalp, back, arms, chest, and abdomen, was performed.  - Right upper back: pink papule with peripheral granular pigment clods  - There are dome shaped bright red papules on the trunk and extremities.   - Multiple regular brown pigmented macules and papules are identified on the trunk and extremities.   - Scattered brown macules on sun exposed areas.  - There are waxy stuck on tan to brown papules on the trunk and extremities.     - No other lesions of concern on areas examined.     Medications:  Current Outpatient Medications   Medication     fluorouracil (EFUDEX) 5 % external cream     MULTIPLE VITAMIN PO     Omega-3 Fatty Acids (OMEGA 3 PO)     ondansetron (ZOFRAN-ODT) 8 MG ODT tab     oxyCODONE IR (ROXICODONE) 10 MG tablet     SUMAtriptan  (IMITREX STATDOSE) 6 MG/0.5ML pen injector kit     UBRELVY 50 MG tablet     No current facility-administered medications for this visit.      Past Medical History:   Patient Active Problem List   Diagnosis   (none) - all problems resolved or deleted     No past medical history on file.         Again, thank you for allowing me to participate in the care of your patient.        Sincerely,        Antoni Spencer MD

## 2023-01-23 ENCOUNTER — OFFICE VISIT (OUTPATIENT)
Dept: DERMATOLOGY | Facility: CLINIC | Age: 63
End: 2023-01-23
Payer: MEDICARE

## 2023-01-23 DIAGNOSIS — Z85.828 HISTORY OF NONMELANOMA SKIN CANCER: Primary | ICD-10-CM

## 2023-01-23 DIAGNOSIS — D22.9 MULTIPLE BENIGN NEVI: ICD-10-CM

## 2023-01-23 DIAGNOSIS — D18.01 CHERRY ANGIOMA: ICD-10-CM

## 2023-01-23 DIAGNOSIS — L21.9 DERMATITIS, SEBORRHEIC: ICD-10-CM

## 2023-01-23 DIAGNOSIS — L81.4 SOLAR LENTIGO: ICD-10-CM

## 2023-01-23 DIAGNOSIS — L82.1 SEBORRHEIC KERATOSES: ICD-10-CM

## 2023-01-23 DIAGNOSIS — D23.9 DERMATOFIBROMA: ICD-10-CM

## 2023-01-23 DIAGNOSIS — L91.8 SKIN TAG: ICD-10-CM

## 2023-01-23 PROCEDURE — 99213 OFFICE O/P EST LOW 20 MIN: CPT | Performed by: DERMATOLOGY

## 2023-01-23 NOTE — PATIENT INSTRUCTIONS
Patient Education     Checking for Skin Cancer  You can find cancer early by checking your skin each month. There are 3 kinds of skin cancer. They are melanoma, basal cell carcinoma, and squamous cell carcinoma. Doing monthly skin checks is the best way to find new marks or skin changes. Follow the instructions below for checking your skin.   The ABCDEs of checking moles for melanoma   Check your moles or growths for signs of melanoma using ABCDE:   Asymmetry: the sides of the mole or growth don t match  Border: the edges are ragged, notched, or blurred  Color: the color within the mole or growth varies  Diameter: the mole or growth is larger than 6 mm (size of a pencil eraser)  Evolving: the size, shape, or color of the mole or growth is changing (evolving is not shown in the images below)    Checking for other types of skin cancer  Basal cell carcinoma or squamous cell carcinoma have symptoms such as:     A spot or mole that looks different from all other marks on your skin  Changes in how an area feels, such as itching, tenderness, or pain  Changes in the skin's surface, such as oozing, bleeding, or scaliness  A sore that does not heal  New swelling or redness beyond the border of a mole    Who s at risk?  Anyone can get skin cancer. But you are at greater risk if you have:   Fair skin, light-colored hair, or light-colored eyes  Many moles or abnormal moles on your skin  A history of sunburns from sunlight or tanning beds  A family history of skin cancer  A history of exposure to radiation or chemicals  A weakened immune system  If you have had skin cancer in the past, you are at risk for recurring skin cancer.   How to check your skin  Do your monthly skin checkups in front of a full-length mirror. Check all parts of your body, including your:   Head (ears, face, neck, and scalp)  Torso (front, back, and sides)  Arms (tops, undersides, upper, and lower armpits)  Hands (palms, backs, and fingers, including under  the nails)  Buttocks and genitals  Legs (front, back, and sides)  Feet (tops, soles, toes, including under the nails, and between toes)  If you have a lot of moles, take digital photos of them each month. Make sure to take photos both up close and from a distance. These can help you see if any moles change over time.   Most skin changes are not cancer. But if you see any changes in your skin, call your doctor right away. Only he or she can diagnose a problem. If you have skin cancer, seeing your doctor can be the first step toward getting the treatment that could save your life.   Locately last reviewed this educational content on 4/1/2019 2000-2020 [employer]. [address] [place] [address], [place] [address]. All rights reserved. This information is not intended as a substitute for professional medical care. Always follow your healthcare professional's instructions.       When should I call my doctor?  If you are worsening or not improving, please, contact us or seek urgent care as noted below.     Who should I call with questions (adults)?  [facility] (adult and pediatric): [phone]  [facility] (adult): [phone]  For urgent needs outside of business hours call the [facility] at [phone] and ask for the dermatology resident on call to be paged  If this is a medical emergency and you are unable to reach an ER, Call [phone]    Who should I call with questions (pediatric)?  [facility]- Pediatric Dermatology  Dr. Ayah Govea, Dr. Sarabjit Knapp, Dr. Rosemary Rowan, VIJI Coffman, Dr. Awa Sue, Dr. Isabelle Orellana & Dr. Alfredito Zhong  Non-urgent nurse triage line; [phone]- Luz Maria and Tamika KATZ Care Coordinators   Sandrine (/Complex ) [phone]    If you need a prescription refill, please contact your pharmacy. Refills are approved or denied by our Physicians  during normal business hours, Monday through Fridays  Per office policy, refills will not be granted if you have not been seen within the past year (or sooner depending on your child's condition)    Scheduling Information:  Pediatric Appointment Scheduling and Call Center [phone]  Radiology Scheduling- [phone]  Sedation Unit Scheduling- [phone]  [place] Scheduling- General [phone]; Pediatric Dermatology [phone]  Main  Services: [phone]  [place]: [phone]  Citizen of [place]: [phone]  [place]mong/[place]: [phone]  Preadmission Nursing Department Fax Number: [phone] (Fax all pre-operative paperwork to this number)    For urgent matters arising during evenings, weekends, or holidays that cannot wait for normal business hours please call [phone] and ask for the dermatology resident on call to be paged.

## 2023-01-23 NOTE — LETTER
1/23/2023         RE: Dez Mullen  [address] [facility] [address]        Dear Colleague,    Thank you for referring your patient, Dez Mullen, to the [facility]. Please see a copy of my visit note below.    [facility] Dermatology Note  Encounter Date: Jan 23, 2023  Office Visit     Dermatology Problem List:  Last skin check 1/23/23, recommended yearly  1. History of NMSC  - BCC - right upper back, s/p bx 1/19/22, Efudex initiated 2/3/22   - s/p ED&C 7/25/22     Social history: Grew up in [place]. 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 History of blistering sunburns in childhood.  Family history: Father had a BCC.  ____________________________________________    Assessment & Plan:    # History of nonmelanoma skin cancer, no clinical evidence of recurrence.  - ABCDEs: Counseled ABCDEs of melanoma: Asymmetry, Border (irregularity), Color (not uniform, changes in color), Diameter (greater than 6 mm which is about the size of a pencil eraser), and Evolving (any changes in preexisting moles).  - Sun protection: Counseled SPF30+ sunscreen, UPF clothing, sun avoidance, tanning bed avoidance.   - Recommended regular skin checks.     # Skin tags - left neck. Offered cryotherapy for removal, pt defers at this time. Will contact if becomes bothersome.   - Contact clinic if bothersome.    # Seborrheic dermatitis - scalp and eyebrows. Reviewed treatment with shampoos, patient defers at this time.    - No further intervention needed.      # Benign lesions: Multiple benign nevi, solar lentigos, dermatofibroma, seborrheic keratoses, cherry angiomas. Explained to patient benign nature of lesion. No treatment is necessary at this time unless the lesion changes or becomes symptomatic.   - ABCDEs of melanoma were discussed and self skin checks were advised.  - Sun precaution was advised including the use of sun  "screens of SPF 30 or higher, sun protective clothing, and avoidance of tanning beds.    Procedures Performed:   None.    Follow-up: 1 year(s) in-person for a skin check, or earlier for new or changing lesions    Staff and Scribe:     Scribe Disclosure:   I, Adán Nesbitt, am serving as a scribe to document services personally performed by this physician, Dr. Antoni Spencer, based on data collection and the provider's statements to me.     Provider Disclosure:   The documentation recorded by the scribe accurately reflects the services I personally performed and the decisions made by me.    Antoni Spencer MD    Department of Dermatology  [facility]: Phone: [phone], Fax:[phone]  [facility]: Phone: [phone] Fax: [phone]  ____________________________________________    CC: Skin Check (FBSC: areas of concern- skin tag on left side of neck and dryness above eyelids.Hx of NMSC. )    HPI:  Mr. Dez Mullen is a(n) 62 year old male who presents today as a return patient for a skin check.    Last seen 7/25/22 for ED&C of a BCC on the right upper back.     Today, he is concerned about a skin tag on the left side of the neck. He also notes some dryness above the eyelids. He reports he occasionally gets \"crusties\" in the scalp.     The patient otherwise denies any new or concerning lesions. No bleeding, painful, pruritic, or changing lesions. Health otherwise stable. No other skin concerns. They do not use sunscreen but wear protective clothing when outdoors for sun protection.     Labs Reviewed:  N/A    Physical Exam:  Vitals: There were no vitals taken for this visit.  SKIN: Full skin, which includes the head/face, both arms, chest, back, abdomen,both legs, genitalia and/or groin buttocks, digits and/or nails, was examined.  - There are dome shaped bright red papules on the trunk " and extremities.   - Multiple regular brown pigmented macules and papules are identified on the trunk and extremities.   - Scattered brown macules on sun exposed areas.  - There are waxy stuck on tan to brown papules on the trunk and extremities.    - Well healed scar at site of previous NMSC, no repigmentation or nodularity noted.    - There is a firm tan/flesh colored papule that dimples with lateral pressure on the right lower leg.   - No other lesions of concern on areas examined.     Medications:  Current Outpatient Medications   Medication     fluorouracil (EFUDEX) 5 % external cream     MULTIPLE VITAMIN PO     Omega-3 Fatty Acids (OMEGA 3 PO)     ondansetron (ZOFRAN-ODT) 8 MG ODT tab     oxyCODONE IR (ROXICODONE) 10 MG tablet     REYVOW 50 MG TABS     SUMAtriptan (IMITREX STATDOSE) 6 MG/0.5ML pen injector kit     UBRELVY 50 MG tablet     No current facility-administered medications for this visit.      Past Medical History:   Patient Active Problem List   Diagnosis   (none) - all problems resolved or deleted     No past medical history on file.        Again, thank you for allowing me to participate in the care of your patient.        Sincerely,        Antoni Spencer MD

## 2023-01-23 NOTE — PROGRESS NOTES
[facility] Dermatology Note  Encounter Date: Jan 23, 2023  Office Visit     Dermatology Problem List:  Last skin check 1/23/23, recommended yearly  1. History of NMSC  - BCC - right upper back, s/p bx 1/19/22, Efudex initiated 2/3/22   - s/p ED&C 7/25/22     Social history: Grew up in [place]. 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 History of blistering sunburns in childhood.  Family history: Father had a BCC.  ____________________________________________    Assessment & Plan:    # History of nonmelanoma skin cancer, no clinical evidence of recurrence.  - ABCDEs: Counseled ABCDEs of melanoma: Asymmetry, Border (irregularity), Color (not uniform, changes in color), Diameter (greater than 6 mm which is about the size of a pencil eraser), and Evolving (any changes in preexisting moles).  - Sun protection: Counseled SPF30+ sunscreen, UPF clothing, sun avoidance, tanning bed avoidance.   - Recommended regular skin checks.     # Skin tags - left neck. Offered cryotherapy for removal, pt defers at this time. Will contact if becomes bothersome.   - Contact clinic if bothersome.    # Seborrheic dermatitis - scalp and eyebrows. Reviewed treatment with shampoos, patient defers at this time.    - No further intervention needed.      # Benign lesions: Multiple benign nevi, solar lentigos, dermatofibroma, seborrheic keratoses, cherry angiomas. Explained to patient benign nature of lesion. No treatment is necessary at this time unless the lesion changes or becomes symptomatic.   - ABCDEs of melanoma were discussed and self skin checks were advised.  - Sun precaution was advised including the use of sun screens of SPF 30 or higher, sun protective clothing, and avoidance of tanning beds.    Procedures Performed:   None.    Follow-up: 1 year(s) in-person for a skin check, or earlier for new or changing lesions    Staff and Scribe:     Scribe Disclosure:   I,  "Adán Nesbitt, am serving as a scribe to document services personally performed by this physician, Dr. Antoni Spencer, based on data collection and the provider's statements to me.     Provider Disclosure:   The documentation recorded by the scribe accurately reflects the services I personally performed and the decisions made by me.    Antoni Spencer MD    Department of Dermatology  [facility]: Phone: [phone], Fax:[phone]  [facility]: Phone: [phone] Fax: [phone]  ____________________________________________    CC: Skin Check (FBSC: areas of concern- skin tag on left side of neck and dryness above eyelids.Hx of NMSC. )    HPI:  Mr. Dez Mullen is a(n) 62 year old male who presents today as a return patient for a skin check.    Last seen 7/25/22 for ED&C of a BCC on the right upper back.     Today, he is concerned about a skin tag on the left side of the neck. He also notes some dryness above the eyelids. He reports he occasionally gets \"crusties\" in the scalp.     The patient otherwise denies any new or concerning lesions. No bleeding, painful, pruritic, or changing lesions. Health otherwise stable. No other skin concerns. They do not use sunscreen but wear protective clothing when outdoors for sun protection.     Labs Reviewed:  N/A    Physical Exam:  Vitals: There were no vitals taken for this visit.  SKIN: Full skin, which includes the head/face, both arms, chest, back, abdomen,both legs, genitalia and/or groin buttocks, digits and/or nails, was examined.  - There are dome shaped bright red papules on the trunk and extremities.   - Multiple regular brown pigmented macules and papules are identified on the trunk and extremities.   - Scattered brown macules on sun exposed areas.  - There are waxy stuck on tan to brown papules on the trunk and extremities.    - Well " healed scar at site of previous NMSC, no repigmentation or nodularity noted.    - There is a firm tan/flesh colored papule that dimples with lateral pressure on the right lower leg.   - No other lesions of concern on areas examined.     Medications:  Current Outpatient Medications   Medication     fluorouracil (EFUDEX) 5 % external cream     MULTIPLE VITAMIN PO     Omega-3 Fatty Acids (OMEGA 3 PO)     ondansetron (ZOFRAN-ODT) 8 MG ODT tab     oxyCODONE IR (ROXICODONE) 10 MG tablet     REYVOW 50 MG TABS     SUMAtriptan (IMITREX STATDOSE) 6 MG/0.5ML pen injector kit     UBRELVY 50 MG tablet     No current facility-administered medications for this visit.      Past Medical History:   Patient Active Problem List   Diagnosis   (none) - all problems resolved or deleted     No past medical history on file.

## 2023-01-23 NOTE — NURSING NOTE
Dez Mullen's chief complaint for this visit includes:  Chief Complaint   Patient presents with     Skin Check     FBSC: areas of concern- skin tag on left side of neck and dryness above eyelids.Hx of NMSC.      PCP: No Ref-Primary, Physician    Referring Provider:  Antoni Spencer MD  [address] [address]    There were no vitals taken for this visit.  Data Unavailable        Allergies   Allergen Reactions     Promethazine Other (See Comments)     PN: muscle stiffness, Dystonic movement       Propranolol Swelling     Tongue swelling     Adhesive Tape Other (See Comments)     Pt has had reactions to the clear/gray plastic tape [place] [facility] in [place] past.      Other Drug Allergy (See Comments) Other (See Comments)     PN: muscle stiffness     Diphenhydramine Anxiety and Other (See Comments)     Pt reports got in combo with zyprexa and became very uncomfortable and restless.        Olanzapine Anxiety and Other (See Comments)     PN: muscle stiffness, Dystonic movement  Pt reports got in combo with zyprexa and became very uncomfortable and restless.            Do you need any medication refills at today's visit?  No.       Caity Araujo LPN

## 2024-02-05 ENCOUNTER — OFFICE VISIT (OUTPATIENT)
Dept: DERMATOLOGY | Facility: CLINIC | Age: 64
End: 2024-02-05
Payer: MEDICARE

## 2024-02-05 DIAGNOSIS — L82.1 SEBORRHEIC KERATOSES: ICD-10-CM

## 2024-02-05 DIAGNOSIS — L21.9 DERMATITIS, SEBORRHEIC: ICD-10-CM

## 2024-02-05 DIAGNOSIS — D18.01 CHERRY ANGIOMA: ICD-10-CM

## 2024-02-05 DIAGNOSIS — Z85.828 HISTORY OF NONMELANOMA SKIN CANCER: Primary | ICD-10-CM

## 2024-02-05 DIAGNOSIS — L82.0 INFLAMED SEBORRHEIC KERATOSIS: ICD-10-CM

## 2024-02-05 DIAGNOSIS — L81.4 SOLAR LENTIGO: ICD-10-CM

## 2024-02-05 DIAGNOSIS — D22.9 MULTIPLE BENIGN NEVI: ICD-10-CM

## 2024-02-05 PROCEDURE — 17110 DESTRUCTION B9 LES UP TO 14: CPT | Performed by: DERMATOLOGY

## 2024-02-05 PROCEDURE — 99213 OFFICE O/P EST LOW 20 MIN: CPT | Mod: 25 | Performed by: DERMATOLOGY

## 2024-02-05 RX ORDER — KETOCONAZOLE 20 MG/ML
SHAMPOO TOPICAL
Qty: 120 ML | Refills: 11 | Status: SHIPPED | OUTPATIENT
Start: 2024-02-05

## 2024-02-05 RX ORDER — KETOCONAZOLE 20 MG/G
CREAM TOPICAL 2 TIMES DAILY
Qty: 30 G | Refills: 11 | Status: SHIPPED | OUTPATIENT
Start: 2024-02-05

## 2024-02-05 ASSESSMENT — PAIN SCALES - GENERAL: PAINLEVEL: NO PAIN (0)

## 2024-02-05 NOTE — LETTER
2/5/2024         RE: Dez Mullen  [address] [facility] [address]        Dear Colleague,    Thank you for referring your patient, Dez Mullen, to the [facility]. Please see a copy of my visit note below.    [facility] Dermatology Note    Encounter Date: Feb 5, 2024    Dermatology Problem List:  Last skin check 2/5/24, recommended yearly  1. History of NMSC  - BCC - right upper back, s/p bx 1/19/22, Efudex initiated 2/3/22              - s/p ED&C 7/25/22       Social history: Grew up in [place]. 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 History of blistering sunburns in childhood.  Family history: Father had a BCC.  ______________________________________    Impression/Plan:  1. Seborrheic dermatitis, scalp and facial involvement.  - ketoconazole (NIZORAL) 2% external cream, apply topically BID.   -  ketoconazole (NIZORAL) 2% external shampoo, apply topically 3 times a week.     2. Inflamed SK, left clavicle  Cryotherapy procedure note: After verbal consent and discussion of risks and benefits including, but not limited to, dyspigmentation/scar, blister, and pain, 1 was treated with 1-2 mm freeze border for 1-2 cycles with liquid nitrogen. Post cryotherapy instructions were provided.     3. Reassurance provided for benign lesions not treated today including cherry angiomata, solar lentigines, seborrheic keratoses, and banal-appearing melanocytic nevi.    4. History of nonmelanoma skin cancer, no clinical evidence of recurrence.       Follow-up in 6 months.       Staff Involved:  Staff and Scribe    Scribe Disclosure:   I, LOREN MCKOY, am serving as a scribe; to document services personally performed by Alfredito Vang MD -based on data collection and the provider's statements to me.     Provider Disclosure:   The documentation recorded by the scribe accurately reflects the services I personally  "performed and the decisions made by me.    Alfredito Vang MD   of Dermatology  Department of Dermatology  [facility]        CC:   Chief Complaint   Patient presents with     Skin Check     FBSC - hx of NMSC, patient wants a skin tag on neck removed.It gets irritated and sore.       History of Present Illness:  Mr. Dez Mullen is a 63 year old male who presents as a return patient for FBSE. He wants to remove a skin tag that is recently become irritating and is catching on stuff. He mentioned that he gets \"crusty deposits\" on his scalp and eyelid, that was previously treated with ketoconazole shampoo.  He adds that it goes away with the ketoconazole but always comes back.     Patient reports a lot sun exposure while growing up due to surfing, and working on fishing boats. .     Labs:  N/A    Physical exam:  Vitals: There were no vitals taken for this visit.  GEN: This is a well developed, well-nourished male in no acute distress, in a pleasant mood.    SKIN: Rendon phototype II  - Full skin, which includes the head/face, both arms, chest, back, abdomen,both legs, genitalia and/or groin buttocks, digits and/or nails, was examined.  - There are dome shaped bright red papules on the head/neck, trunk, extremities.   - Multiple regular brown pigmented macules and papules are identified on the head/neck, trunk, extremities.   - Scattered brown macules on sun exposed areas.  - There are waxy stuck on tan to brown papules on the head/neck, trunk, extremities.  - scaling on the scalp and right eyebrow.  - erythematous scaling patch on central forehead  - No other lesions of concern on areas examined.     Past Medical History:   No past medical history on file.  No past surgical history on file.    Social History:   reports that he quit smoking about 19 years ago. His smoking use included cigarettes. He has never used smokeless tobacco. He reports that he does not " drink alcohol and does not use drugs.    Family History:  No family history on file.    Medications:  Current Outpatient Medications   Medication Sig Dispense Refill     MULTIPLE VITAMIN PO Take  by mouth.       Omega-3 Fatty Acids (OMEGA 3 PO) Take  by mouth.       ondansetron (ZOFRAN-ODT) 8 MG ODT tab        SUMAtriptan (IMITREX STATDOSE) 6 MG/0.5ML pen injector kit sumatriptan 6 mg/0.5 mL subcutaneous pen injector       fluorouracil (EFUDEX) 5 % external cream Apply thin layer once daily to BCC on the right upper back for 6 weeks. (Patient not taking: Reported on 4/5/2022) 40 g 0     oxyCODONE IR (ROXICODONE) 10 MG tablet  (Patient not taking: Reported on 2/5/2024)       REYVOW 50 MG TABS TAKE ONE TABLET BY MOUTH ONCE AS DIRECTED (Patient not taking: Reported on 2/5/2024)       UBRELVY 50 MG tablet take 1 tablet by mouth once may repeat dose once after 2 hours if needed (Patient not taking: Reported on 7/25/2022)       Allergies   Allergen Reactions     Promethazine Other (See Comments)     PN: muscle stiffness, Dystonic movement       Propranolol Swelling     Tongue swelling     Adhesive Tape Other (See Comments)     Pt has had reactions to the clear/gray plastic tape at [facility] in the past.      Other Drug Allergy (See Comments) Other (See Comments)     PN: muscle stiffness     Diphenhydramine Anxiety and Other (See Comments)     Pt reports got in combo with zyprexa and became very uncomfortable and restless.        Olanzapine Anxiety and Other (See Comments)     PN: muscle stiffness, Dystonic movement  Pt reports got in combo with zyprexa and became very uncomfortable and restless.            Again, thank you for allowing me to participate in the care of your patient.        Sincerely,        Alfredito Vang MD

## 2024-02-05 NOTE — PROGRESS NOTES
[facility] Dermatology Note    Encounter Date: Feb 5, 2024    Dermatology Problem List:  Last skin check 2/5/24, recommended yearly  1. History of NMSC  - BCC - right upper back, s/p bx 1/19/22, Efudex initiated 2/3/22              - s/p ED&C 7/25/22       Social history: Grew up in [place]. 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 History of blistering sunburns in childhood.  Family history: Father had a BCC.  ______________________________________    Impression/Plan:  1. Seborrheic dermatitis, scalp and facial involvement.  - ketoconazole (NIZORAL) 2% external cream, apply topically BID.   -  ketoconazole (NIZORAL) 2% external shampoo, apply topically 3 times a week.     2. Inflamed SK, left clavicle  Cryotherapy procedure note: After verbal consent and discussion of risks and benefits including, but not limited to, dyspigmentation/scar, blister, and pain, 1 was treated with 1-2 mm freeze border for 1-2 cycles with liquid nitrogen. Post cryotherapy instructions were provided.     3. Reassurance provided for benign lesions not treated today including cherry angiomata, solar lentigines, seborrheic keratoses, and banal-appearing melanocytic nevi.    4. History of nonmelanoma skin cancer, no clinical evidence of recurrence.       Follow-up in 6 months.       Staff Involved:  Staff and Scribe    Scribe Disclosure:   I, LOREN MCKOY, am serving as a scribe; to document services personally performed by Alfredito Vang MD -based on data collection and the provider's statements to me.     Provider Disclosure:   The documentation recorded by the scribe accurately reflects the services I personally performed and the decisions made by me.    Alfredito Vang MD   of Dermatology  Department of Dermatology  [facility]        CC:   Chief Complaint   Patient presents with    Skin Check     FBSC - hx of  "NMSC, patient wants a skin tag on neck removed.It gets irritated and sore.       History of Present Illness:  Mr. Dez Mullen is a 63 year old male who presents as a return patient for FBSE. He wants to remove a skin tag that is recently become irritating and is catching on stuff. He mentioned that he gets \"crusty deposits\" on his scalp and eyelid, that was previously treated with ketoconazole shampoo.  He adds that it goes away with the ketoconazole but always comes back.     Patient reports a lot sun exposure while growing up due to surfing, and working on fishing boats. .     Labs:  N/A    Physical exam:  Vitals: There were no vitals taken for this visit.  GEN: This is a well developed, well-nourished male in no acute distress, in a pleasant mood.    SKIN: Rendon phototype II  - Full skin, which includes the head/face, both arms, chest, back, abdomen,both legs, genitalia and/or groin buttocks, digits and/or nails, was examined.  - There are dome shaped bright red papules on the head/neck, trunk, extremities.   - Multiple regular brown pigmented macules and papules are identified on the head/neck, trunk, extremities.   - Scattered brown macules on sun exposed areas.  - There are waxy stuck on tan to brown papules on the head/neck, trunk, extremities.  - scaling on the scalp and right eyebrow.  - erythematous scaling patch on central forehead  - No other lesions of concern on areas examined.     Past Medical History:   No past medical history on file.  No past surgical history on file.    Social History:   reports that he quit smoking about 19 years ago. His smoking use included cigarettes. He has never used smokeless tobacco. He reports that he does not drink alcohol and does not use drugs.    Family History:  No family history on file.    Medications:  Current Outpatient Medications   Medication Sig Dispense Refill    MULTIPLE VITAMIN PO Take  by mouth.      Omega-3 Fatty Acids (OMEGA 3 PO) Take  by " mouth.      ondansetron (ZOFRAN-ODT) 8 MG ODT tab       SUMAtriptan (IMITREX STATDOSE) 6 MG/0.5ML pen injector kit sumatriptan 6 mg/0.5 mL subcutaneous pen injector      fluorouracil (EFUDEX) 5 % external cream Apply thin layer once daily to BCC on the right upper back for 6 weeks. (Patient not taking: Reported on 4/5/2022) 40 g 0    oxyCODONE IR (ROXICODONE) 10 MG tablet  (Patient not taking: Reported on 2/5/2024)      REYVOW 50 MG TABS TAKE ONE TABLET BY MOUTH ONCE AS DIRECTED (Patient not taking: Reported on 2/5/2024)      UBRELVY 50 MG tablet take 1 tablet by mouth once may repeat dose once after 2 hours if needed (Patient not taking: Reported on 7/25/2022)       Allergies   Allergen Reactions    Promethazine Other (See Comments)     PN: muscle stiffness, Dystonic movement      Propranolol Swelling     Tongue swelling    Adhesive Tape Other (See Comments)     Pt has had reactions to the clear/gray plastic tape at [facility] in the past.     Other Drug Allergy (See Comments) Other (See Comments)     PN: muscle stiffness    Diphenhydramine Anxiety and Other (See Comments)     Pt reports got in combo with zyprexa and became very uncomfortable and restless.       Olanzapine Anxiety and Other (See Comments)     PN: muscle stiffness, Dystonic movement  Pt reports got in combo with zyprexa and became very uncomfortable and restless.

## 2024-02-05 NOTE — NURSING NOTE
Dermatology Rooming Note    Dez Mullen's goals for this visit include:   Chief Complaint   Patient presents with    Skin Check     FBSC - hx of NMSC, patient wants a skin tag on neck removed.It gets irritated and sore.        - Does patient need refills? N/A    - Last skin check was 01/23/2024    Izabella Funes

## 2024-08-04 NOTE — PROGRESS NOTES
[facility] Dermatology Note    Encounter Date: Aug 5, 2024    Dermatology Problem List:  Last skin check 8/5/2024, recommended yearly  NUB on the left dorsal forearm, s/p shave bx 8/5/2024 - results pending  1. History of NMSC  - BCC - right upper back, s/p bx 1/19/22, Efudex initiated 2/3/22              - s/p ED&C 7/25/22        Social history: Grew up in [place]. 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 History of blistering sunburns in childhood.  Family history: Father had a BCC.    ______________________________________    Impression/Plan:  1. History of NMSC  - No evidence of recurrence    2. NUB on the left dorsal forearm  After discussion of benefits and risks including but not limited to bleeding, infection, scar, incomplete removal, recurrence, and non-diagnostic biopsy, written consent and photographs were obtained. The area was cleaned with isopropyl alcohol. 1.5 mL of 1% lidocaine with epinephrine was injected to obtain adequate anesthesia of the lesion on the left dorsal forearm. A shave biopsy was performed. Hemostasis was achieved with aluminium chloride. Petrolatum ointment and a sterile dressing were applied. The patient tolerated the procedure and no complications were noted. The patient was provided with verbal and written post care instructions.     3. Reassurance provided for benign lesions not treated today including neurofibroma, cherry angiomata, solar lentigines, seborrheic keratoses, and banal-appearing melanocytic nevi.        Follow-up in 6 months.       Staff Involved:  Staff and Scribe    I, Albania Jorgensen, am serving as a scribe; to document services personally performed by Alfredito Vang MD -based on data collection and the provider's statements to me.    Provider Disclosure:   The documentation recorded by the scribe accurately reflects the services I personally performed and the decisions made by me.    Alfredito  PATRICIA Vang MD   of Dermatology  Department of Dermatology  [facility]      CC:   Chief Complaint   Patient presents with    Skin Check     FBSE, area of concern on lower left arm present for past few years.  HX of BCC.         History of Present Illness:  Mr. Dez Mullen is a 63 year old male who presents as a return patient here for a full body skin check. Patient has an area of concern on the lower left arm that has been present for the past few years. It gets thick, tears off, and bleeds. He has a history of BCC.    Patient is otherwise feeling well, with no additional skin concerns.     Labs:  N/A    Physical exam:  Vitals: There were no vitals taken for this visit.  GEN: This is a well developed, well-nourished male in no acute distress, in a pleasant mood.    SKIN: Rendon phototype II  - Full skin, which includes the head/face, both arms, chest, back, abdomen,both legs, genitalia and/or groin buttocks, digits and/or nails, was examined.  - There are dome shaped bright red papules on the head/neck, trunk, extremities.   - Multiple regular brown pigmented macules and papules are identified on the head/neck, trunk, extremities.   - Scattered brown macules on sun exposed areas.  - There are waxy stuck on tan to brown papules on the head/neck, trunk, extremities.  - Hyperkeratotic papule on the left dorsal forearm.  - Rubbery pink papule on the chest.  - No other lesions of concern on areas examined.     Past Medical History:   No past medical history on file.  No past surgical history on file.    Social History:   reports that he quit smoking about 19 years ago. His smoking use included cigarettes. He has never used smokeless tobacco. He reports that he does not drink alcohol and does not use drugs.    Family History:  No family history on file.    Medications:  Current Outpatient Medications   Medication Sig Dispense Refill    ketoconazole (NIZORAL) 2  % external cream Apply topically 2 times daily 30 g 11    ketoconazole (NIZORAL) 2 % external shampoo Apply topically three times a week 120 mL 11    MULTIPLE VITAMIN PO Take  by mouth.      Omega-3 Fatty Acids (OMEGA 3 PO) Take  by mouth.      ondansetron (ZOFRAN-ODT) 8 MG ODT tab       SUMAtriptan (IMITREX STATDOSE) 6 MG/0.5ML pen injector kit sumatriptan 6 mg/0.5 mL subcutaneous pen injector      fluorouracil (EFUDEX) 5 % external cream Apply thin layer once daily to BCC on the right upper back for 6 weeks. (Patient not taking: Reported on 4/5/2022) 40 g 0    oxyCODONE IR (ROXICODONE) 10 MG tablet  (Patient not taking: Reported on 2/5/2024)      REYVOW 50 MG TABS TAKE ONE TABLET BY MOUTH ONCE AS DIRECTED (Patient not taking: Reported on 2/5/2024)      UBRELVY 50 MG tablet take 1 tablet by mouth once may repeat dose once after 2 hours if needed (Patient not taking: Reported on 7/25/2022)       Allergies   Allergen Reactions    Promethazine Other (See Comments)     PN: muscle stiffness, Dystonic movement      Propranolol Swelling     Tongue swelling    Adhesive Tape Other (See Comments)     Pt has had reactions to the clear/gray plastic tape at [facility] in the past.     Other Drug Allergy (See Comments) Other (See Comments)     PN: muscle stiffness    Pregabalin Other (See Comments)    Diphenhydramine Anxiety and Other (See Comments)     Pt reports got in combo with zyprexa and became very uncomfortable and restless.       Olanzapine Anxiety and Other (See Comments)     PN: muscle stiffness, Dystonic movement  Pt reports got in combo with zyprexa and became very uncomfortable and restless.

## 2024-08-05 ENCOUNTER — OFFICE VISIT (OUTPATIENT)
Dept: DERMATOLOGY | Facility: CLINIC | Age: 64
End: 2024-08-05
Payer: MEDICARE

## 2024-08-05 DIAGNOSIS — L82.1 SEBORRHEIC KERATOSIS: ICD-10-CM

## 2024-08-05 DIAGNOSIS — Z85.828 HISTORY OF NONMELANOMA SKIN CANCER: Primary | ICD-10-CM

## 2024-08-05 DIAGNOSIS — D48.5 NEOPLASM OF UNCERTAIN BEHAVIOR OF SKIN: ICD-10-CM

## 2024-08-05 DIAGNOSIS — D22.9 MULTIPLE MELANOCYTIC NEVI: ICD-10-CM

## 2024-08-05 DIAGNOSIS — L81.4 SOLAR LENTIGO: ICD-10-CM

## 2024-08-05 DIAGNOSIS — D18.01 CHERRY ANGIOMA: ICD-10-CM

## 2024-08-05 DIAGNOSIS — D36.10 NEUROFIBROMA: ICD-10-CM

## 2024-08-05 PROCEDURE — 11102 TANGNTL BX SKIN SINGLE LES: CPT | Performed by: DERMATOLOGY

## 2024-08-05 PROCEDURE — 88305 TISSUE EXAM BY PATHOLOGIST: CPT | Performed by: PATHOLOGY

## 2024-08-05 PROCEDURE — 99213 OFFICE O/P EST LOW 20 MIN: CPT | Mod: 25 | Performed by: DERMATOLOGY

## 2024-08-05 RX ORDER — LIDOCAINE HYDROCHLORIDE AND EPINEPHRINE 10; 10 MG/ML; UG/ML
3 INJECTION, SOLUTION INFILTRATION; PERINEURAL ONCE
Status: ACTIVE | OUTPATIENT
Start: 2024-08-05

## 2024-08-05 ASSESSMENT — PAIN SCALES - GENERAL: PAINLEVEL: SEVERE PAIN (6)

## 2024-08-05 NOTE — LETTER
8/5/2024      Dez Mullen  [address] [address]      Dear Colleague,    Thank you for referring your patient, Dez Mullen, to the [facility]. Please see a copy of my visit note below.    [facility] Dermatology Note    Encounter Date: Aug 5, 2024    Dermatology Problem List:  Last skin check 8/5/2024, recommended yearly  NUB on the left dorsal forearm, s/p shave bx 8/5/2024 - results pending  1. History of NMSC  - BCC - right upper back, s/p bx 1/19/22, Efudex initiated 2/3/22              - s/p ED&C 7/25/22        Social history: Grew up in [place]. 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 History of blistering sunburns in childhood.  Family history: Father had a BCC.    ______________________________________    Impression/Plan:  1. History of NMSC  - No evidence of recurrence    2. NUB on the left dorsal forearm  After discussion of benefits and risks including but not limited to bleeding, infection, scar, incomplete removal, recurrence, and non-diagnostic biopsy, written consent and photographs were obtained. The area was cleaned with isopropyl alcohol. 1.5 mL of 1% lidocaine with epinephrine was injected to obtain adequate anesthesia of the lesion on the left dorsal forearm. A shave biopsy was performed. Hemostasis was achieved with aluminium chloride. Petrolatum ointment and a sterile dressing were applied. The patient tolerated the procedure and no complications were noted. The patient was provided with verbal and written post care instructions.     3. Reassurance provided for benign lesions not treated today including neurofibroma, cherry angiomata, solar lentigines, seborrheic keratoses, and banal-appearing melanocytic nevi.        Follow-up in 6 months.       Staff Involved:  Staff and Scribe    I, Albania Jorgensen, am serving as a scribe; to document services personally performed by  Alfredito Vang MD -based on data collection and the provider's statements to me.    Provider Disclosure:   The documentation recorded by the scribe accurately reflects the services I personally performed and the decisions made by me.    Alfredito Vang MD   of Dermatology  Department of Dermatology  [facility]      CC:   Chief Complaint   Patient presents with     Skin Check     FBSE, area of concern on lower left arm present for past few years.  HX of BCC.         History of Present Illness:  Mr. Dez Mullen is a 63 year old male who presents as a return patient here for a full body skin check. Patient has an area of concern on the lower left arm that has been present for the past few years. It gets thick, tears off, and bleeds. He has a history of BCC.    Patient is otherwise feeling well, with no additional skin concerns.     Labs:  N/A    Physical exam:  Vitals: There were no vitals taken for this visit.  GEN: This is a well developed, well-nourished male in no acute distress, in a pleasant mood.    SKIN: Rendon phototype II  - Full skin, which includes the head/face, both arms, chest, back, abdomen,both legs, genitalia and/or groin buttocks, digits and/or nails, was examined.  - There are dome shaped bright red papules on the head/neck, trunk, extremities.   - Multiple regular brown pigmented macules and papules are identified on the head/neck, trunk, extremities.   - Scattered brown macules on sun exposed areas.  - There are waxy stuck on tan to brown papules on the head/neck, trunk, extremities.  - Hyperkeratotic papule on the left dorsal forearm.  - Rubbery pink papule on the chest.  - No other lesions of concern on areas examined.     Past Medical History:   No past medical history on file.  No past surgical history on file.    Social History:   reports that he quit smoking about 19 years ago. His smoking use included cigarettes. He has never  used smokeless tobacco. He reports that he does not drink alcohol and does not use drugs.    Family History:  No family history on file.    Medications:  Current Outpatient Medications   Medication Sig Dispense Refill     ketoconazole (NIZORAL) 2 % external cream Apply topically 2 times daily 30 g 11     ketoconazole (NIZORAL) 2 % external shampoo Apply topically three times a week 120 mL 11     MULTIPLE VITAMIN PO Take  by mouth.       Omega-3 Fatty Acids (OMEGA 3 PO) Take  by mouth.       ondansetron (ZOFRAN-ODT) 8 MG ODT tab        SUMAtriptan (IMITREX STATDOSE) 6 MG/0.5ML pen injector kit sumatriptan 6 mg/0.5 mL subcutaneous pen injector       fluorouracil (EFUDEX) 5 % external cream Apply thin layer once daily to BCC on the right upper back for 6 weeks. (Patient not taking: Reported on 4/5/2022) 40 g 0     oxyCODONE IR (ROXICODONE) 10 MG tablet  (Patient not taking: Reported on 2/5/2024)       REYVOW 50 MG TABS TAKE ONE TABLET BY MOUTH ONCE AS DIRECTED (Patient not taking: Reported on 2/5/2024)       UBRELVY 50 MG tablet take 1 tablet by mouth once may repeat dose once after 2 hours if needed (Patient not taking: Reported on 7/25/2022)       Allergies   Allergen Reactions     Promethazine Other (See Comments)     PN: muscle stiffness, Dystonic movement       Propranolol Swelling     Tongue swelling     Adhesive Tape Other (See Comments)     Pt has had reactions to the clear/gray plastic tape at [facility] in the past.      Other Drug Allergy (See Comments) Other (See Comments)     PN: muscle stiffness     Pregabalin Other (See Comments)     Diphenhydramine Anxiety and Other (See Comments)     Pt reports got in combo with zyprexa and became very uncomfortable and restless.        Olanzapine Anxiety and Other (See Comments)     PN: muscle stiffness, Dystonic movement  Pt reports got in combo with zyprexa and became very uncomfortable and restless.                    Again, thank you for allowing me to participate in  the care of your patient.        Sincerely,        Alfredito Vang MD

## 2024-08-05 NOTE — NURSING NOTE
Dez Mullen's goals for this visit include:   Chief Complaint   Patient presents with    Skin Check     FBSE, area of concern on lower left arm present for past few years.  HX of BCC.         He requests these members of his care team be copied on today's visit information:     PCP: No Ref-Primary, Physician    Referring Provider:  Referred Self, MD  No address on file    There were no vitals taken for this visit.    Do you need any medication refills at today's visit?     Molly Odom on 8/5/2024 at 1:02 PM

## 2024-08-05 NOTE — NURSING NOTE
The following medication was given:     MEDICATION:  Lidocaine with epinephrine 1% 1:[id number]  ROUTE: SQ  SITE: see procedure note  DOSE: 1 ml  LOT #: [id number]  : VDP  EXPIRATION DATE: 12/31/2025  NDC#: 62535-727-30  Was there drug waste? yes  Multi-dose vial: Yes    Molly Odom  August 5, 2024

## 2024-08-07 LAB
PATH REPORT.COMMENTS IMP SPEC: NORMAL
PATH REPORT.COMMENTS IMP SPEC: NORMAL
PATH REPORT.FINAL DX SPEC: NORMAL
PATH REPORT.GROSS SPEC: NORMAL
PATH REPORT.MICROSCOPIC SPEC OTHER STN: NORMAL
PATH REPORT.RELEVANT HX SPEC: NORMAL

## 2024-08-08 ENCOUNTER — TELEPHONE (OUTPATIENT)
Dept: DERMATOLOGY | Facility: CLINIC | Age: 64
End: 2024-08-08
Payer: MEDICARE

## 2024-08-08 NOTE — TELEPHONE ENCOUNTER
Writer called pt to discuss pathology results. Pt stated that the wound is healing well with no signs of infection. Pt denied having any questions or concerns at this time.       Rocío White RN on 8/8/2024 at 1:20 PM    Final Diagnosis  A(1). Skin, left dorsal forearm, shave:  - Hypertrophic actinic keratosis - (see description)     Electronically signed by Kerwin Aden MD on 8/7/2024 at  8:11 AM      Result Notes     Alfredito Vang MD  8/8/2024 12:56 PM CDT Back to Top    Mr. Mullen,  The biopsy came back as a precancerous actinic keratosis. We should reevaluate at your next visit and treat with cryotherapy (liquid nitrogen) if there is any residual lesion.  Thank you,  Alfredito Vang MD, FAAD   of Dermatology  Department of Dermatology  [facility]

## 2024-11-12 ENCOUNTER — OFFICE VISIT (OUTPATIENT)
Dept: FAMILY MEDICINE | Facility: CLINIC | Age: 64
End: 2024-11-12
Payer: MEDICARE

## 2024-11-12 VITALS
HEART RATE: 65 BPM | HEIGHT: 70 IN | TEMPERATURE: 98.2 F | SYSTOLIC BLOOD PRESSURE: 126 MMHG | DIASTOLIC BLOOD PRESSURE: 83 MMHG | BODY MASS INDEX: 28.77 KG/M2 | WEIGHT: 201 LBS | RESPIRATION RATE: 16 BRPM | OXYGEN SATURATION: 94 %

## 2024-11-12 DIAGNOSIS — Z12.11 SCREEN FOR COLON CANCER: ICD-10-CM

## 2024-11-12 DIAGNOSIS — Z29.11 NEED FOR VACCINATION AGAINST RESPIRATORY SYNCYTIAL VIRUS: ICD-10-CM

## 2024-11-12 DIAGNOSIS — G44.321 INTRACTABLE CHRONIC POST-TRAUMATIC HEADACHE: ICD-10-CM

## 2024-11-12 DIAGNOSIS — R10.84 ABDOMINAL PAIN, GENERALIZED: ICD-10-CM

## 2024-11-12 DIAGNOSIS — Z00.00 ENCOUNTER FOR MEDICARE ANNUAL WELLNESS EXAM: Primary | ICD-10-CM

## 2024-11-12 DIAGNOSIS — R73.01 ELEVATED FASTING GLUCOSE: ICD-10-CM

## 2024-11-12 DIAGNOSIS — Z13.29 SCREENING FOR THYROID DISORDER: ICD-10-CM

## 2024-11-12 DIAGNOSIS — Z13.220 SCREENING FOR HYPERLIPIDEMIA: ICD-10-CM

## 2024-11-12 DIAGNOSIS — Z23 NEED FOR SHINGLES VACCINE: ICD-10-CM

## 2024-11-12 DIAGNOSIS — N50.819 PAIN IN TESTICLE, UNSPECIFIED LATERALITY: ICD-10-CM

## 2024-11-12 DIAGNOSIS — Z12.5 SCREENING FOR PROSTATE CANCER: ICD-10-CM

## 2024-11-12 LAB
ALBUMIN SERPL BCG-MCNC: 4.3 G/DL (ref 3.5–5.2)
ALP SERPL-CCNC: 60 U/L (ref 40–150)
ALT SERPL W P-5'-P-CCNC: 15 U/L (ref 0–70)
ANION GAP SERPL CALCULATED.3IONS-SCNC: 11 MMOL/L (ref 7–15)
AST SERPL W P-5'-P-CCNC: 21 U/L (ref 0–45)
BASOPHILS # BLD AUTO: 0.1 10E3/UL (ref 0–0.2)
BASOPHILS NFR BLD AUTO: 1 %
BILIRUB SERPL-MCNC: 0.5 MG/DL
BUN SERPL-MCNC: 11.6 MG/DL (ref 8–23)
CALCIUM SERPL-MCNC: 9.1 MG/DL (ref 8.8–10.4)
CHLORIDE SERPL-SCNC: 104 MMOL/L (ref 98–107)
CREAT SERPL-MCNC: 1 MG/DL (ref 0.67–1.17)
EGFRCR SERPLBLD CKD-EPI 2021: 85 ML/MIN/1.73M2
EOSINOPHIL # BLD AUTO: 0.1 10E3/UL (ref 0–0.7)
EOSINOPHIL NFR BLD AUTO: 2 %
ERYTHROCYTE [DISTWIDTH] IN BLOOD BY AUTOMATED COUNT: 11.9 % (ref 10–15)
EST. AVERAGE GLUCOSE BLD GHB EST-MCNC: 105 MG/DL
GLUCOSE SERPL-MCNC: 105 MG/DL (ref 70–99)
HBA1C MFR BLD: 5.3 % (ref 0–5.6)
HCO3 SERPL-SCNC: 25 MMOL/L (ref 22–29)
HCT VFR BLD AUTO: 45.7 % (ref 40–53)
HGB BLD-MCNC: 15.3 G/DL (ref 13.3–17.7)
IMM GRANULOCYTES # BLD: 0 10E3/UL
IMM GRANULOCYTES NFR BLD: 0 %
LYMPHOCYTES # BLD AUTO: 1.2 10E3/UL (ref 0.8–5.3)
LYMPHOCYTES NFR BLD AUTO: 19 %
MCH RBC QN AUTO: 28.6 PG (ref 26.5–33)
MCHC RBC AUTO-ENTMCNC: 33.5 G/DL (ref 31.5–36.5)
MCV RBC AUTO: 85 FL (ref 78–100)
MONOCYTES # BLD AUTO: 0.5 10E3/UL (ref 0–1.3)
MONOCYTES NFR BLD AUTO: 8 %
NEUTROPHILS # BLD AUTO: 4.7 10E3/UL (ref 1.6–8.3)
NEUTROPHILS NFR BLD AUTO: 71 %
PLATELET # BLD AUTO: 296 10E3/UL (ref 150–450)
POTASSIUM SERPL-SCNC: 4.2 MMOL/L (ref 3.4–5.3)
PROT SERPL-MCNC: 6.5 G/DL (ref 6.4–8.3)
PSA SERPL DL<=0.01 NG/ML-MCNC: 1.59 NG/ML (ref 0–4.5)
RBC # BLD AUTO: 5.35 10E6/UL (ref 4.4–5.9)
SODIUM SERPL-SCNC: 140 MMOL/L (ref 135–145)
TSH SERPL DL<=0.005 MIU/L-ACNC: 3.28 UIU/ML (ref 0.3–4.2)
WBC # BLD AUTO: 6.7 10E3/UL (ref 4–11)

## 2024-11-12 PROCEDURE — 85025 COMPLETE CBC W/AUTO DIFF WBC: CPT | Performed by: PHYSICIAN ASSISTANT

## 2024-11-12 PROCEDURE — 83036 HEMOGLOBIN GLYCOSYLATED A1C: CPT | Performed by: PHYSICIAN ASSISTANT

## 2024-11-12 PROCEDURE — 36415 COLL VENOUS BLD VENIPUNCTURE: CPT | Performed by: PHYSICIAN ASSISTANT

## 2024-11-12 PROCEDURE — 84443 ASSAY THYROID STIM HORMONE: CPT | Mod: GZ | Performed by: PHYSICIAN ASSISTANT

## 2024-11-12 PROCEDURE — 80053 COMPREHEN METABOLIC PANEL: CPT | Performed by: PHYSICIAN ASSISTANT

## 2024-11-12 PROCEDURE — G0103 PSA SCREENING: HCPCS | Performed by: PHYSICIAN ASSISTANT

## 2024-11-12 PROCEDURE — G0438 PPPS, INITIAL VISIT: HCPCS | Performed by: PHYSICIAN ASSISTANT

## 2024-11-12 PROCEDURE — 99204 OFFICE O/P NEW MOD 45 MIN: CPT | Mod: 25 | Performed by: PHYSICIAN ASSISTANT

## 2024-11-12 RX ORDER — ONDANSETRON 8 MG/1
8 TABLET, ORALLY DISINTEGRATING ORAL EVERY 8 HOURS PRN
Qty: 30 TABLET | Refills: 1 | Status: SHIPPED | OUTPATIENT
Start: 2024-11-12

## 2024-11-12 SDOH — HEALTH STABILITY: PHYSICAL HEALTH: ON AVERAGE, HOW MANY DAYS PER WEEK DO YOU ENGAGE IN MODERATE TO STRENUOUS EXERCISE (LIKE A BRISK WALK)?: 2 DAYS

## 2024-11-12 ASSESSMENT — PAIN SCALES - GENERAL: PAINLEVEL_OUTOF10: NO PAIN (0)

## 2024-11-12 ASSESSMENT — SOCIAL DETERMINANTS OF HEALTH (SDOH): HOW OFTEN DO YOU GET TOGETHER WITH FRIENDS OR RELATIVES?: TWICE A WEEK

## 2024-11-12 NOTE — LETTER
"November 13, 2024      Dez E Paz  [address] [address]        Dear Dez,    Your electrolytes, kidney function and liver function were normal.    Your blood sugar is borderline elevated.    This is in the \"prediabetes\" range.  You are not currently diabetic, but at risk for developing this disease in the future.   Exercise, weight loss,  and limiting carbohydrates and sugars in the diet can be helpful to avoid progression to diabetes.  At minimum we need to check your blood sugar annually.    Please be seen urgently if you develop any signs or symptoms of diabetes (increase thirst or urination, fatigue, blurry vision, unexplained weight loss).   Your prostate cancer screening test was negative.   Your thyroid function was normal.     Your blood counts and hemoglobin were normal.     Resulted Orders   Comprehensive metabolic panel (BMP + Alb, Alk Phos, ALT, AST, Total. Bili, TP)   Result Value Ref Range    Sodium 140 135 - 145 mmol/L    Potassium 4.2 3.4 - 5.3 mmol/L    Carbon Dioxide (CO2) 25 22 - 29 mmol/L    Anion Gap 11 7 - 15 mmol/L    Urea Nitrogen 11.6 8.0 - 23.0 mg/dL    Creatinine 1.00 0.67 - 1.17 mg/dL    GFR Estimate 85 >60 mL/min/1.73m2      Comment:      eGFR calculated using 2021 CKD-EPI equation.    Calcium 9.1 8.8 - 10.4 mg/dL      Comment:      Reference intervals for this test were updated on 7/16/2024 to reflect our healthy population more accurately. There may be differences in the flagging of prior results with similar values performed with this method. Those prior results can be interpreted in the context of the updated reference intervals.    Chloride 104 98 - 107 mmol/L    Glucose 105 (H) 70 - 99 mg/dL    Alkaline Phosphatase 60 40 - 150 U/L    AST 21 0 - 45 U/L    ALT 15 0 - 70 U/L    Protein Total 6.5 6.4 - 8.3 g/dL    Albumin 4.3 3.5 - 5.2 g/dL    Bilirubin Total 0.5 <=1.2 mg/dL   PSA, screen   Result Value Ref Range    Prostate Specific Antigen Screen 1.59 " 0.00 - 4.50 ng/mL    Narrative    This result is obtained using the Roche Elecsys total PSA method on the dyana e801 immunoassay analyzer, which is an ultrasensitive method. Results obtained with different assay methods or kits cannot be used interchangeably.  This test is intended for initial prostate cancer screening. PSA values exceeding the age-specific limits are suspicious for prostate disease, but additional testing, such as prostate biopsy, is needed to diagnose prostate pathology. The American Cancer Society recommends annual examination with digital rectal examination and serum PSA beginning at age 50 and for men with a life expectancy of at least 10 years after detection of prostate cancer. For men in high-risk groups, such as  Americans or men with a first-degree relative diagnosed at a younger age, testing should begin at a younger age. It is generally recommended that information be provided to patients about the benefits and limitations of testing and treatment so they can make informed decisions.   TSH with free T4 reflex   Result Value Ref Range    TSH 3.28 0.30 - 4.20 uIU/mL   CBC with platelets and differential   Result Value Ref Range    WBC Count 6.7 4.0 - 11.0 10e3/uL    RBC Count 5.35 4.40 - 5.90 10e6/uL    Hemoglobin 15.3 13.3 - 17.7 g/dL    Hematocrit 45.7 40.0 - 53.0 %    MCV 85 78 - 100 fL    MCH 28.6 26.5 - 33.0 pg    MCHC 33.5 31.5 - 36.5 g/dL    RDW 11.9 10.0 - 15.0 %    Platelet Count 296 150 - 450 10e3/uL    % Neutrophils 71 %    % Lymphocytes 19 %    % Monocytes 8 %    % Eosinophils 2 %    % Basophils 1 %    % Immature Granulocytes 0 %    Absolute Neutrophils 4.7 1.6 - 8.3 10e3/uL    Absolute Lymphocytes 1.2 0.8 - 5.3 10e3/uL    Absolute Monocytes 0.5 0.0 - 1.3 10e3/uL    Absolute Eosinophils 0.1 0.0 - 0.7 10e3/uL    Absolute Basophils 0.1 0.0 - 0.2 10e3/uL    Absolute Immature Granulocytes 0.0 <=0.4 10e3/uL   Hemoglobin A1c   Result Value Ref Range    Estimated Average  Glucose 105 <117 mg/dL    Hemoglobin A1C 5.3 0.0 - 5.6 %      Comment:      Normal <5.7%   Prediabetes 5.7-6.4%    Diabetes 6.5% or higher     Note: Adopted from ADA consensus guidelines.       If you have any questions or concerns, please call the clinic at the number listed above.       Sincerely,      Padmini Marinelli PA-C

## 2024-11-12 NOTE — PATIENT INSTRUCTIONS
I will notify you of lab results via [employer].    Return urgently if any change in symptoms.    Schedule colonoscopy   Follow up with neurology  Follow up with urology   Schedule abdominal ultrasound at [facility] ([phone]) formerly called [facility].      Patient Education   Preventive Care Advice   This is general advice given by our system to help you stay healthy. However, your care team may have specific advice just for you. Please talk to your care team about your preventive care needs.  Nutrition  Eat 5 or more servings of fruits and vegetables each day.  Try wheat bread, brown rice and whole grain pasta (instead of white bread, rice, and pasta).  Get enough calcium and vitamin D. Check the label on foods and aim for 100% of the RDA (recommended daily allowance).  Lifestyle  Exercise at least 150 minutes each week  (30 minutes a day, 5 days a week).  Do muscle strengthening activities 2 days a week. These help control your weight and prevent disease.  No smoking.  Wear sunscreen to prevent skin cancer.  Have a dental exam and cleaning every 6 months.  Yearly exams  See your health care team every year to talk about:  Any changes in your health.  Any medicines your care team has prescribed.  Preventive care, family planning, and ways to prevent chronic diseases.  Shots (vaccines)   HPV shots (up to age 26), if you've never had them before.  Hepatitis B shots (up to age 59), if you've never had them before.  COVID-19 shot: Get this shot when it's due.  Flu shot: Get a flu shot every year.  Tetanus shot: Get a tetanus shot every 10 years.  Pneumococcal, hepatitis A, and RSV shots: Ask your care team if you need these based on your risk.  Shingles shot (for age 50 and up)  General health tests  Diabetes screening:  Starting at age 35, Get screened for diabetes at least every 3 years.  If you are younger than age 35, ask your care team if you should be screened for  diabetes.  Cholesterol test: At age 39, start having a cholesterol test every 5 years, or more often if advised.  Bone density scan (DEXA): At age 50, ask your care team if you should have this scan for osteoporosis (brittle bones).  Hepatitis C: Get tested at least once in your life.  STIs (sexually transmitted infections)  Before age 24: Ask your care team if you should be screened for STIs.  After age 24: Get screened for STIs if you're at risk. You are at risk for STIs (including HIV) if:  You are sexually active with more than one person.  You don't use condoms every time.  You or a partner was diagnosed with a sexually transmitted infection.  If you are at risk for HIV, ask about PrEP medicine to prevent HIV.  Get tested for HIV at least once in your life, whether you are at risk for HIV or not.  Cancer screening tests  Cervical cancer screening: If you have a cervix, begin getting regular cervical cancer screening tests starting at age 21.  Breast cancer scan (mammogram): If you've ever had breasts, begin having regular mammograms starting at age 40. This is a scan to check for breast cancer.  Colon cancer screening: It is important to start screening for colon cancer at age 45.  Have a colonoscopy test every 10 years (or more often if you're at risk) Or, ask your provider about stool tests like a FIT test every year or Cologuard test every 3 years.  To learn more about your testing options, visit:   .  For help making a decision, visit:   https://bit.ly/up22416.  Prostate cancer screening test: If you have a prostate, ask your care team if a prostate cancer screening test (PSA) at age 55 is right for you.  Lung cancer screening: If you are a current or former smoker ages 50 to 80, ask your care team if ongoing lung cancer screenings are right for you.  For informational purposes only. Not to replace the advice of your health care provider. Copyright   2023 [employer]. All rights reserved.  Clinically reviewed by the [facility]itions [facility]. [employer] 852491 - REV 01/24.  Learning About Sleeping Well  What does sleeping well mean?     Sleeping well means getting enough sleep to feel good and stay healthy. How much sleep is enough varies among people.  The number of hours you sleep and how you feel when you wake up are both important. If you do not feel refreshed, you probably need more sleep. Another sign of not getting enough sleep is feeling tired during the day.  Experts recommend that adults get at least 7 or more hours of sleep per day. Children and older adults need more sleep.  Why is getting enough sleep important?  Getting enough quality sleep is a basic part of good health. When your sleep suffers, your physical health, mood, and your thoughts can suffer too. You may find yourself feeling more grumpy or stressed. Not getting enough sleep also can lead to serious problems, including injury, accidents, anxiety, and depression.  What might cause poor sleeping?  Many things can cause sleep problems, including:  Changes to your sleep schedule.  Stress. Stress can be caused by fear about a single event, such as giving a speech. Or you may have ongoing stress, such as worry about work or school.  Depression, anxiety, and other mental or emotional conditions.  Changes in your sleep habits or surroundings. This includes changes that happen where you sleep, such as noise, light, or sleeping in a different bed. It also includes changes in your sleep pattern, such as having jet lag or working a late shift.  Health problems, such as pain, breathing problems, and restless legs syndrome.  Lack of regular exercise.  Using alcohol, nicotine, or caffeine before bed.  How can you help yourself?  Here are some tips that may help you sleep more soundly and wake up feeling more refreshed.  Your sleeping area   Use your bedroom only for sleeping and sex. A bit of light reading may help you fall  "asleep. But if it doesn't, do your reading elsewhere in the house. Try not to use your TV, computer, smartphone, or tablet while you are in bed.  Be sure your bed is big enough to stretch out comfortably, especially if you have a sleep partner.  Keep your bedroom quiet, dark, and cool. Use curtains, blinds, or a sleep mask to block out light. To block out noise, use earplugs, soothing music, or a \"white noise\" machine.  Your evening and bedtime routine   Create a relaxing bedtime routine. You might want to take a warm shower or bath, or listen to soothing music.  Go to bed at the same time every night. And get up at the same time every morning, even if you feel tired.  What to avoid   Limit caffeine (coffee, tea, caffeinated sodas) during the day, and don't have any for at least 6 hours before bedtime.  Avoid drinking alcohol before bedtime. Alcohol can cause you to wake up more often during the night.  Try not to smoke or use tobacco, especially in the evening. Nicotine can keep you awake.  Limit naps during the day, especially close to bedtime.  Avoid lying in bed awake for too long. If you can't fall asleep or if you wake up in the middle of the night and can't get back to sleep within about 20 minutes, get out of bed and go to another room until you feel sleepy.  Avoid taking medicine right before bed that may keep you awake or make you feel hyper or energized. Your doctor can tell you if your medicine may do this and if you can take it earlier in the day.  If you can't sleep   Imagine yourself in a peaceful, pleasant scene. Focus on the details and feelings of being in a place that is relaxing.  Get up and do a quiet or boring activity until you feel sleepy.  Avoid drinking any liquids before going to bed to help prevent waking up often to use the bathroom.  Where can you learn more?  Go to https://www.healthwise.net/patiented  Enter J942 in the search box to learn more about \"Learning About Sleeping " "Well.\"  Current as of: July 10, 2023  Content Version: 14.2 2024 [facility] [employer].   Care instructions adapted under license by your healthcare professional. If you have questions about a medical condition or this instruction, always ask your healthcare professional. [employer] disclaims any warranty or liability for your use of this information.    Chronic Pain: Care Instructions  Your Care Instructions     Chronic pain is pain that lasts a long time (months or even years) and may or may not have a clear cause. It is different from acute pain, which usually does have a clear cause--like an injury or illness--and gets better over time. Chronic pain:  Lasts over time but may vary from day to day.  Does not go away despite efforts to end it.  May disrupt your sleep and lead to fatigue.  May cause depression or anxiety.  May make your muscles tense, causing more pain.  Can disrupt your work, hobbies, home life, and relationships with friends and family.  Chronic pain is a very real condition. It is not just in your head. Treatment can help and usually includes several methods used together, such as medicines, physical therapy, exercise, and other treatments. Learning how to relax and changing negative thought patterns can also help you cope.  Chronic pain is complex. Taking an active role in your treatment will help you better manage your pain. Tell your doctor if you have trouble dealing with your pain. You may have to try several things before you find what works best for you.  Follow-up care is a key part of your treatment and safety. Be sure to make and go to all appointments, and call your doctor if you are having problems. It's also a good idea to know your test results and keep a list of the medicines you take.  How can you care for yourself at home?  Pace yourself. Break up large jobs into smaller tasks. Save harder tasks for days when you have less pain, or go back and forth between hard " tasks and easier ones. Take rest breaks.  Relax, and reduce stress. Relaxation techniques such as deep breathing or meditation can help.  Keep moving. Gentle, daily exercise can help reduce pain over the long run. Try low- or no-impact exercises such as walking, swimming, and stationary biking. Do stretches to stay flexible.  Try heat, cold packs, and massage.  Get enough sleep. Chronic pain can make you tired and drain your energy. Talk with your doctor if you have trouble sleeping because of pain.  Think positive. Your thoughts can affect your pain level. Do things that you enjoy to distract yourself when you have pain instead of focusing on the pain. See a movie, read a book, listen to music, or spend time with a friend.  If you think you are depressed, talk to your doctor about treatment.  Keep a daily pain diary. Record how your moods, thoughts, sleep patterns, activities, and medicine affect your pain. You may find that your pain is worse during or after certain activities or when you are feeling a certain emotion. Having a record of your pain can help you and your doctor find the best ways to treat your pain.  Take pain medicines exactly as directed.  If the doctor gave you a prescription medicine for pain, take it as prescribed.  If you are not taking a prescription pain medicine, ask your doctor if you can take an over-the-counter medicine.  Reducing constipation caused by pain medicine  Talk to your doctor about a laxative. If a laxative doesn't work, your doctor may suggest a prescription medicine.  Include fruits, vegetables, beans, and whole grains in your diet each day. These foods are high in fiber.  If your doctor recommends it, get more exercise. Walking is a good choice. Bit by bit, increase the amount you walk every day. Try for at least 30 minutes on most days of the week.  Schedule time each day for a bowel movement. A daily routine may help. Take your time and do not strain when having a bowel  "movement.  When should you call for help?   Call your doctor now or seek immediate medical care if:    Your pain gets worse or is out of control.     You feel down or blue, or you do not enjoy things like you once did. You may be depressed, which is common in people with chronic pain. Depression can be treated.     You have vomiting or cramps for more than 2 hours.   Watch closely for changes in your health, and be sure to contact your doctor if:    You cannot sleep because of pain.     You are very worried or anxious about your pain.     You have trouble taking your pain medicine.     You have any concerns about your pain medicine.     You have trouble with bowel movements, such as:  No bowel movement in 3 days.  Blood in the anal area, in your stool, or on the toilet paper.  Diarrhea for more than 24 hours.   Where can you learn more?  Go to https://www.[employer].net/patiented  Enter N004 in the search box to learn more about \"Chronic Pain: Care Instructions.\"  Current as of: July 10, 2023  Content Version: 14.2 2024 [facility] [employer].   Care instructions adapted under license by your healthcare professional. If you have questions about a medical condition or this instruction, always ask your healthcare professional. [employer] disclaims any warranty or liability for your use of this information.       "

## 2024-11-12 NOTE — PROGRESS NOTES
"Preventive Care Visit  [facility]  Padmini Marinelli PA-C, Family Medicine  Nov 12, 2024      Assessment & Plan     Encounter for Medicare annual wellness exam  Benign exam- declines all vaccines     Intractable chronic post-traumatic headache  Given zofran for nausea- follow up with neurology  Sees provider and on pain contract for opioids   - ondansetron (ZOFRAN ODT) 8 MG ODT tab  Dispense: 30 tablet; Refill: 1  - Adult Neurology  Referral    Abdominal pain, generalized  Not tender. Check renal and liver function   Normal blood counts - ultrasound to further evaluate and rule out liver disease -gallstones  - Comprehensive metabolic panel (BMP + Alb, Alk Phos, ALT, AST, Total. Bili, TP)  - CBC with platelets and differential  - Comprehensive metabolic panel (BMP + Alb, Alk Phos, ALT, AST, Total. Bili, TP)  - US Abdomen Complete  - Comprehensive metabolic panel (BMP + Alb, Alk Phos, ALT, AST, Total. Bili, TP)  - CBC with platelets and differential    Pain in testicle, unspecified laterality  Declines  exam- no pain at rest only if bumps or grabbed   Follow up with urology- has had testicular ultrasound in past   - Adult Urology  Referral    Need for shingles vaccine  Declined     Need for vaccination against respiratory syncytial virus  Declined     Screen for colon cancer  Overdue for screening   - Colonoscopy Screening  Referral    Screening for hyperlipidemia  Lipids were done 2/9/24-       Screening for prostate cancer    - PSA, screen  - PSA, screen    Screening for thyroid disorder    - TSH with free T4 reflex  - TSH with free T4 reflex          Elevated fasting glucose  Normal A1C   - Hemoglobin A1c  - Hemoglobin A1c              BMI  Estimated body mass index is 28.84 kg/m  as calculated from the following:    Height as of this encounter: 1.778 m (5' 10\").    Weight as of this encounter: 91.2 kg (201 lb).   Weight management plan: Discussed " healthy diet and exercise guidelines    Counseling  Appropriate preventive services were addressed with this patient via screening, questionnaire, or discussion as appropriate for fall prevention, nutrition, physical activity, Tobacco-use cessation, social engagement, weight loss and cognition.  Checklist reviewing preventive services available has been given to the patient.  Reviewed patient's diet, addressing concerns and/or questions.   He is at risk for lack of exercise and has been provided with information to increase physical activity for the benefit of his well-being.   Discussed possible causes of fatigue. I have reviewed Opioid Use Disorder and Substance Use Disorder risk factors and made any needed referrals.       I will notify you of lab results via [employer].    Return urgently if any change in symptoms.    Schedule colonoscopy   Follow up with neurology  Follow up with urology   Schedule abdominal ultrasound at [facility] ([phone]) formerly called [facility].        Whitley Garces is a 64 year old, presenting for the following:  Medicare Visit        11/12/2024     9:00 AM   Additional Questions   Roomed by Irma HERNANDEZ   Accompanied by self           HPI  Patient new to me presents to establish care and discuss concerns. Is on medicare -[facility]  History of head injury with chronic headaches.  No family history of migraines.  Was in [employer]- served in [place].  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    Complains of diffuse abdominal pain and nausea. Reports some nausea and no emesis. Abdominal pain not related to meals.  Feels like a cramp and then will improve  Complains of breathing issues.  Stay same every year - thought may be obstructive pulmonary disease.  Complains of testicular pain- has had normal ultrasound.  Provider recommended snug fitting underwear  and helped a bit but pain has not resolved. If wife bumps or grabs testicles will have pain. At rest no pain  Has 3 kids grown-  Had  Covid and arrhythmia for year - arrhythmia resolved         Health Care Directive  Patient does not have a Health Care Directive: Discussed advance care planning with patient; information given to patient to review.      11/12/2024   General Health   How would you rate your overall physical health? (!) FAIR   Feel stress (tense, anxious, or unable to sleep) Patient declined            11/12/2024   Nutrition   Diet: Regular (no restrictions)            11/12/2024   Exercise   Days per week of moderate/strenous exercise 2 days      (!) EXERCISE CONCERN      11/12/2024   Social Factors   Frequency of gathering with friends or relatives Twice a week   Worry food won't last until get money to buy more Patient declined   Food not last or not have enough money for food? Patient declined   Do you have housing? (Housing is defined as stable permanent housing and does not include staying ouside in a car, in a tent, in an abandoned building, in an overnight shelter, or couch-surfing.) Patient declined   Are you worried about losing your housing? Patient declined   Lack of transportation? Patient declined   Unable to get utilities (heat,electricity)? Patient declined            11/12/2024   Fall Risk   Fallen 2 or more times in the past year? No    Trouble with walking or balance? No        Patient-reported          11/12/2024   Activities of Daily Living- Home Safety   Needs help with the following daily activites None of the above   Safety concerns in the home None of the above            11/12/2024   Dental   Dentist two times every year? Yes            11/12/2024   Hearing Screening   Hearing concerns? None of the above            11/12/2024   Driving Risk Screening   Patient/family members have concerns about driving No            11/12/2024   General Alertness/Fatigue Screening   Have you  "been more tired than usual lately? (!) YES            2024   Urinary Incontinence Screening   Bothered by leaking urine in past 6 months No            2024   TB Screening   Were you born outside of the [place]? Yes            Today's PHQ-2 Score:       2024     8:47 AM   PHQ-2 (  [employer])   Q1: Little interest or pleasure in doing things 0    Q2: Feeling down, depressed or hopeless 0    PHQ-2 Score 0    Q1: Little interest or pleasure in doing things Not at all   Q2: Feeling down, depressed or hopeless Not at all   PHQ-2 Score 0       Patient-reported           2024   Substance Use   Alcohol more than 3/day or more than 7/wk No   Do you have a current opioid prescription? (!) YES   How severe/bad is pain from 1 to 10? 1/10   Do you use any other substances recreationally? No          Social History     Tobacco Use    Smoking status: Former     Current packs/day: 0.00     Types: Cigarettes     Quit date:      Years since quittin.8    Smokeless tobacco: Never   Substance Use Topics    Alcohol use: No    Drug use: No             2024   One time HIV Screening   Previous HIV test? I don't know      Last PSA: No results found for: \"PSA\"  ASCVD Risk   The ASCVD Risk score (Beto CABRERA, et al., 2019) failed to calculate for the following reasons:    Cannot find a previous HDL lab    Cannot find a previous total cholesterol lab            Reviewed and updated as needed this visit by Provider   Tobacco   Meds   Med Hx  Surg Hx  Fam Hx  Soc Hx Sexual Activity          BP Readings from Last 3 Encounters:   24 126/83   22 119/82   13 113/66    Wt Readings from Last 3 Encounters:   24 91.2 kg (201 lb)   22 105.6 kg (232 lb 12.8 oz)   13 94.4 kg (208 lb 1.6 oz)                  Patient Active Problem List   Diagnosis    Intractable chronic post-traumatic headache     Past Surgical History:   Procedure Laterality Date    sarahels  1993       Social " History     Tobacco Use    Smoking status: Former     Current packs/day: 0.00     Types: Cigarettes     Quit date:      Years since quittin.8    Smokeless tobacco: Never   Substance Use Topics    Alcohol use: No     Family History   Problem Relation Age of Onset    Other - See Comments Father          age 83    Obesity Brother     Colon Cancer No family hx of     Prostate Cancer No family hx of          Current Outpatient Medications   Medication Sig Dispense Refill    fluorouracil (EFUDEX) 5 % external cream Apply thin layer once daily to BCC on the right upper back for 6 weeks. 40 g 0    ketoconazole (NIZORAL) 2 % external cream Apply topically 2 times daily 30 g 11    ketoconazole (NIZORAL) 2 % external shampoo Apply topically three times a week 120 mL 11    MULTIPLE VITAMIN PO Take  by mouth.      Omega-3 Fatty Acids (OMEGA 3 PO) Take  by mouth.      ondansetron (ZOFRAN ODT) 8 MG ODT tab Take 1 tablet (8 mg) by mouth every 8 hours as needed for nausea. 30 tablet 1    oxyCODONE IR (ROXICODONE) 10 MG tablet       REYVOW 50 MG TABS       SUMAtriptan (IMITREX STATDOSE) 6 MG/0.5ML pen injector kit sumatriptan 6 mg/0.5 mL subcutaneous pen injector      XTAMPZA ER 18 MG 12 hr tablet TAKE 1 CAPSULE BY MOUTH EVERY 12 HOURS FOR PAIN EFD /      Morphine Sulfate ER 15 MG TBEA        Current providers sharing in care for this patient include:  Patient Care Team:  No Ref-Primary, Physician as PCP - General  Antoni Spencer MD as MD (Dermatology)  Alfredito Vang MD as Assigned Surgical Provider    The following health maintenance items are reviewed in Epic and correct as of today:  Health Maintenance   Topic Date Due    ANNUAL REVIEW OF HM ORDERS  Never done    ADVANCE CARE PLANNING  Never done    HIV SCREENING  Never done    MEDICARE ANNUAL WELLNESS VISIT  Never done    HEPATITIS C SCREENING  Never done    LIPID  Never done    GLUCOSE  07/10/2010    ZOSTER IMMUNIZATION (1 of 2) Never done    RSV  "VACCINE (1 - Risk 60-74 years 1-dose series) Never done    COLORECTAL CANCER SCREENING  07/01/2023    INFLUENZA VACCINE (1) 09/01/2024    COVID-19 Vaccine (1 - 2024-25 season) Never done    DTAP/TDAP/TD IMMUNIZATION (4 - Td or Tdap) 11/15/2029    PHQ-2 (once per calendar year)  Completed    Pneumococcal Vaccine: Pediatrics (0 to 5 Years) and At-Risk Patients (6 to 64 Years)  Aged Out    HPV IMMUNIZATION  Aged Out    MENINGITIS IMMUNIZATION  Aged Out    RSV MONOCLONAL ANTIBODY  Aged Out         Review of Systems  CONSTITUTIONAL: NEGATIVE for fever, chills, change in weight  INTEGUMENTARY/SKIN: NEGATIVE for worrisome rashes, moles or lesions  EYES: NEGATIVE for vision changes or irritation  ENT/MOUTH: NEGATIVE for ear, mouth and throat problems  RESP:chronic cough and shortness of breath   CV: NEGATIVE for chest pain, palpitations or peripheral edema  GI: see above- abdominal pain    male :testicular pain bilateral  MUSCULOSKELETAL: NEGATIVE for significant arthralgias or myalgia  NEURO: chronic headache - on opioids   ENDOCRINE: NEGATIVE for temperature intolerance, skin/hair changes  HEME: NEGATIVE for bleeding problems  PSYCHIATRIC: NEGATIVE for changes in mood or affect     Objective    Exam  /83 (BP Location: Right arm, Patient Position: Sitting, Cuff Size: Adult Large)   Pulse 65   Temp 98.2  F (36.8  C) (Oral)   Resp 16   Ht 1.778 m (5' 10\")   Wt 91.2 kg (201 lb)   SpO2 94%   BMI 28.84 kg/m     Estimated body mass index is 28.84 kg/m  as calculated from the following:    Height as of this encounter: 1.778 m (5' 10\").    Weight as of this encounter: 91.2 kg (201 lb).    Physical Exam  GENERAL: alert and no distress  EYES: Eyes grossly normal to inspection, PERRL and conjunctivae and sclerae normal  HENT: ear canals and TM's normal, nose and mouth without ulcers or lesions  NECK: no adenopathy, no asymmetry, masses, or scars  RESP: lungs clear to auscultation - no rales, rhonchi or wheezes  CV: " regular rate and rhythm, normal S1 S2, no S3 or S4, no murmur, click or rub, no peripheral edema  ABDOMEN: soft, nontender, no hepatosplenomegaly, no masses and bowel sounds normal  MS: no gross musculoskeletal defects noted, no edema  SKIN: no suspicious lesions or rashes  NEURO: Normal strength and tone, mentation intact and speech normal  PSYCH: mentation appears normal, affect normal/bright  Declines  exam       11/12/2024   Mini Cog   Clock Draw Score 2 Normal   3 Item Recall 3 objects recalled   Mini Cog Total Score 5                 Signed Electronically by: Padmini Marinelli PA-C

## 2024-11-13 ENCOUNTER — TELEPHONE (OUTPATIENT)
Dept: FAMILY MEDICINE | Facility: CLINIC | Age: 64
End: 2024-11-13
Payer: MEDICARE

## 2024-11-13 PROBLEM — G44.321 INTRACTABLE CHRONIC POST-TRAUMATIC HEADACHE: Status: ACTIVE | Noted: 2024-11-13

## 2024-11-13 RX ORDER — OXYCODONE 18 MG/1
CAPSULE, EXTENDED RELEASE ORAL
COMMUNITY
Start: 2024-11-04

## 2024-11-13 NOTE — TELEPHONE ENCOUNTER
"First attempt. Left message for patient to return call to clinic per provider message as noted:     Padmini Marinelli PA-C  11/13/2024  6:33 AM CST       Please send letter with lab results     Dear Dez  Your electrolytes, kidney function and liver function were normal.    Your blood sugar is borderline elevated.    This is in the \"prediabetes\" range.  You are not currently diabetic, but at risk for developing this disease in the future.  Exercise, weight loss,  and limiting carbohydrates and sugars in the diet can be helpful to avoid progression to diabetes.  At minimum we need to check your blood sugar annually.    Please be seen urgently if you develop any signs or symptoms of diabetes (increase thirst or urination, fatigue, blurry vision, unexplained weight loss).  Your prostate cancer screening test was negative.  Your thyroid function was normal.    Your blood counts and hemoglobin were normal.   Please call or MyChart my office with any questions or concerns.  Padmini Marinelli, PAC       Patient Communication      RN, if/when patient returns call, please review message as shown. Route to TEAM to create letter to mail to patient. Yvan Carmona RN, BSN      "

## 2024-11-13 NOTE — TELEPHONE ENCOUNTER
"Patient notified of results per VIJI Rice as noted:     Padmini Marinelli PA-C  11/13/2024  6:33 AM CST        Please send letter with lab results     Dear Dez  Your electrolytes, kidney function and liver function were normal.    Your blood sugar is borderline elevated.    This is in the \"prediabetes\" range.  You are not currently diabetic, but at risk for developing this disease in the future.  Exercise, weight loss,  and limiting carbohydrates and sugars in the diet can be helpful to avoid progression to diabetes.  At minimum we need to check your blood sugar annually.    Please be seen urgently if you develop any signs or symptoms of diabetes (increase thirst or urination, fatigue, blurry vision, unexplained weight loss).  Your prostate cancer screening test was negative.  Your thyroid function was normal.    Your blood counts and hemoglobin were normal.   Please call or MyChart my office with any questions or concerns.  GALO Ibarra, RN, BSN    "

## 2024-11-13 NOTE — RESULT ENCOUNTER NOTE
"Please send letter with lab results    Dear Dez  Your electrolytes, kidney function and liver function were normal.    Your blood sugar is borderline elevated.    This is in the \"prediabetes\" range.  You are not currently diabetic, but at risk for developing this disease in the future.  Exercise, weight loss,  and limiting carbohydrates and sugars in the diet can be helpful to avoid progression to diabetes.  At minimum we need to check your blood sugar annually.    Please be seen urgently if you develop any signs or symptoms of diabetes (increase thirst or urination, fatigue, blurry vision, unexplained weight loss).  Your prostate cancer screening test was negative.  Your thyroid function was normal.    Your blood counts and hemoglobin were normal.   Please call or MyChart my office with any questions or concerns.  Padmini Marinelli, PAC  "

## 2025-01-06 ENCOUNTER — TRANSFERRED RECORDS (OUTPATIENT)
Dept: HEALTH INFORMATION MANAGEMENT | Facility: CLINIC | Age: 65
End: 2025-01-06
Payer: MEDICARE

## 2025-01-31 ENCOUNTER — TRANSFERRED RECORDS (OUTPATIENT)
Dept: HEALTH INFORMATION MANAGEMENT | Facility: CLINIC | Age: 65
End: 2025-01-31
Payer: MEDICARE

## 2025-02-10 ENCOUNTER — OFFICE VISIT (OUTPATIENT)
Dept: DERMATOLOGY | Facility: CLINIC | Age: 65
End: 2025-02-10
Attending: DERMATOLOGY
Payer: MEDICARE

## 2025-02-10 DIAGNOSIS — D18.01 CHERRY ANGIOMA: ICD-10-CM

## 2025-02-10 DIAGNOSIS — Z85.828 HISTORY OF SKIN CANCER: ICD-10-CM

## 2025-02-10 DIAGNOSIS — L57.0 ACTINIC KERATOSIS: Primary | ICD-10-CM

## 2025-02-10 DIAGNOSIS — D22.9 MULTIPLE MELANOCYTIC NEVI: ICD-10-CM

## 2025-02-10 DIAGNOSIS — L81.4 SOLAR LENTIGO: ICD-10-CM

## 2025-02-10 DIAGNOSIS — L82.1 SEBORRHEIC KERATOSIS: ICD-10-CM

## 2025-02-10 NOTE — PROGRESS NOTES
[facility] Dermatology Note    Encounter Date: Feb 10, 2025    Dermatology Problem List:  Last skin check 2/10/25, recommended yearly    1. History of NMSC  - BCC - right upper back, s/p bx 1/19/22, Efudex initiated 2/3/22              - s/p ED&C 7/25/22  2. HAK   - left dorsal forearm, s/p shave bx 8/5/2024        Social history: Grew up in [place]. 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 History of blistering sunburns in childhood.  Family history: Father had a BCC    ______________________________________    Impression/Plan:  1. History of NMSC  - No evidence of recurrence    2. AK, left dorsal forearm, at site of prior HAK  - Cryotherapy procedure note: After verbal consent and discussion of risks and benefits including but no limited to dyspigmentation/scar, blister, and pain, 1 was(were) treated with 1-2mm freeze border for 2 cycles with liquid nitrogen. Post cryotherapy instructions were provided.    3. Male pattern hair loss  - start 5% Rogaine foam to scalp 1-2 times a day    4. Reassurance provided for benign lesions not treated today including cherry angiomata, solar lentigines, seborrheic keratoses, and banal-appearing melanocytic nevi.       Follow-up in 1 year skin check.       Staff Involved:  Staff and Scribe    Scribe Disclosure:   I, Alexandra Adkins, am serving as a scribe; to document services personally performed by Alfredito Vang MD -based on data collection and the provider's statements to me.     Provider Disclosure:   The documentation recorded by the scribe accurately reflects the services I personally performed and the decisions made by me.    Alfredito Vang MD   of Dermatology  Department of Dermatology  [facility]        CC:   Chief Complaint   Patient presents with    Skin Check     Full body skin check. Spots on upper back, right inner arm. Personal history of  NMSC.       History of Present Illness:  Mr. Dez Mullen is a 64 year old male who presents as a return patient for skin check.      Today, he has some spots of concern, on the upper back and right inner arm. Patient has history of skin cancer.    Labs:  N/a    Physical exam:  Vitals: There were no vitals taken for this visit.  GEN: This is a well developed, well-nourished male in no acute distress, in a pleasant mood.    SKIN: Rendon phototype II  - Full skin, which includes the head/face, both arms, chest, back, abdomen,both legs, genitalia and/or groin buttocks, digits and/or nails, was examined.  - There are dome shaped bright red papules on the head/neck, trunk, extremities.   - Multiple regular brown pigmented macules and papules are identified on the head/neck, trunk, extremities.   - Scattered brown macules on sun exposed areas.  - There are waxy stuck on tan to brown papules on the head/neck, trunk, extremities.   - There is an erythematous macule with overyling adherent scale on the left lateral forearm  - thinning on the frontal and crown of the scalp.  - No other lesions of concern on areas examined.     Past Medical History:   No past medical history on file.  Past Surgical History:   Procedure Laterality Date    1993       Social History:   reports that he quit smoking about 20 years ago. His smoking use included cigarettes. He has never used smokeless tobacco. He reports that he does not drink alcohol and does not use drugs.    Family History:  Family History   Problem Relation Age of Onset    Other - See Comments Father          age 83    Obesity Brother     Colon Cancer No family hx of     Prostate Cancer No family hx of        Medications:  Current Outpatient Medications   Medication Sig Dispense Refill    ketoconazole (NIZORAL) 2 % external shampoo Apply topically three times a week 120 mL 11    MULTIPLE VITAMIN PO Take  by mouth.      Omega-3 Fatty Acids (OMEGA 3 PO) Take  by  mouth.      ondansetron (ZOFRAN ODT) 8 MG ODT tab Take 1 tablet (8 mg) by mouth every 8 hours as needed for nausea. 30 tablet 1    oxyCODONE IR (ROXICODONE) 10 MG tablet       SUMAtriptan (IMITREX STATDOSE) 6 MG/0.5ML pen injector kit sumatriptan 6 mg/0.5 mL subcutaneous pen injector      XTAMPZA ER 18 MG 12 hr tablet TAKE 1 CAPSULE BY MOUTH EVERY 12 HOURS FOR PAIN EFD 11/4      fluorouracil (EFUDEX) 5 % external cream Apply thin layer once daily to BCC on the right upper back for 6 weeks. (Patient not taking: Reported on 2/10/2025) 40 g 0    ketoconazole (NIZORAL) 2 % external cream Apply topically 2 times daily (Patient not taking: Reported on 2/10/2025) 30 g 11    Morphine Sulfate ER 15 MG TBEA  (Patient not taking: Reported on 2/10/2025)      REYVOW 50 MG TABS  (Patient not taking: Reported on 2/10/2025)       Allergies   Allergen Reactions    Promethazine Other (See Comments)     PN: muscle stiffness, Dystonic movement      Propranolol Swelling     Tongue swelling    Adhesive Tape Other (See Comments)     Pt has had reactions to the clear/gray plastic tape at [facility] in the past.     Other Drug Allergy (See Comments) Other (See Comments)     PN: muscle stiffness    Pregabalin Other (See Comments)    Diphenhydramine Anxiety and Other (See Comments)     Pt reports got in combo with zyprexa and became very uncomfortable and restless.       Olanzapine Anxiety and Other (See Comments)     PN: muscle stiffness, Dystonic movement  Pt reports got in combo with zyprexa and became very uncomfortable and restless.

## 2025-02-10 NOTE — NURSING NOTE
Dez Mullen's goals for this visit include:   Chief Complaint   Patient presents with    Skin Check     Full body skin check. Spots on upper back, right inner arm. Personal history of NMSC.       He requests these members of his care team be copied on today's visit information:     PCP: Padmini Marinelli    Referring Provider:  Alfredito Vang MD  [address] [place] [id number]    There were no vitals taken for this visit.    Do you need any medication refills at today's visit? No  Della Wren [facility]
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
## 2025-02-27 NOTE — CONFIDENTIAL NOTE
Reason for visit: Intractable chronic post-traumatic headache    Referring Provider: Padmini Marinelli PA-C [facility]   Office Visit Notes: 1/12/2024       All IMAGING   STATUS/LOCATION   DATE   MRI/MRA External - Requested  [facility]     Internal  09/7/2024, 11/20/2013 08/1/2007    CT/CTA External- Requested  [facility]  09/06/2019, 05/19/2016 01/18/2014, 11/22/2013 11/20/2013, 11/19/2013    LABS Internal, External    EEG N/A    EMG N/A    NEUROPSYCH   TEST: N/A

## 2025-03-04 ENCOUNTER — TRANSFERRED RECORDS (OUTPATIENT)
Dept: HEALTH INFORMATION MANAGEMENT | Facility: CLINIC | Age: 65
End: 2025-03-04
Payer: MEDICARE

## 2025-03-07 ENCOUNTER — TELEPHONE (OUTPATIENT)
Dept: NEUROLOGY | Facility: CLINIC | Age: 65
End: 2025-03-07
Payer: MEDICARE

## 2025-03-07 NOTE — TELEPHONE ENCOUNTER
Left Voicemail (1st Attempt) for patient to call [phone].    Please reschedule appointment on 4-1-25 with Irene Pina PA-C. Irene Pina PA-C is out of the clinic and is unable to see Dez for this appointment. Ok to warm transfer to clinic for rescheduling assistance.

## 2025-03-11 NOTE — TELEPHONE ENCOUNTER
Left Voicemail (2nd Attempt) for patient to call [phone].     Please reschedule appointment on 4-1-25 with Irene Pina PA-C. Irene Pina PA-C is out of the clinic and is unable to see Dez for this appointment. Ok to warm transfer to clinic for rescheduling assistance

## 2025-03-14 NOTE — TELEPHONE ENCOUNTER
Left Voicemail 3rd Attempt) for patient to call [phone].     Please reschedule appointment on 4-1-25 with Irene Pina PA-C. Irene Pina PA-C is out of the clinic and is unable to see Dez for this appointment. Ok to warm transfer to clinic for rescheduling assistance

## 2025-03-17 NOTE — TELEPHONE ENCOUNTER
Left Voicemail 4th Attempt) for patient to call [phone].     Please reschedule appointment on 4-1-25 with Irene Pina PA-C. Irene Pina PA-C is out of the clinic and is unable to see Dez for this appointment. Ok to warm transfer to clinic for rescheduling assistance

## 2025-04-01 ENCOUNTER — PRE VISIT (OUTPATIENT)
Dept: NEUROLOGY | Facility: CLINIC | Age: 65
End: 2025-04-01

## 2025-04-03 ENCOUNTER — TRANSFERRED RECORDS (OUTPATIENT)
Dept: HEALTH INFORMATION MANAGEMENT | Facility: CLINIC | Age: 65
End: 2025-04-03
Payer: MEDICARE

## 2025-06-03 ENCOUNTER — TRANSFERRED RECORDS (OUTPATIENT)
Dept: HEALTH INFORMATION MANAGEMENT | Facility: CLINIC | Age: 65
End: 2025-06-03
Payer: MEDICARE

## 2025-06-27 ENCOUNTER — TRANSFERRED RECORDS (OUTPATIENT)
Dept: HEALTH INFORMATION MANAGEMENT | Facility: CLINIC | Age: 65
End: 2025-06-27
Payer: MEDICARE

## 2025-07-01 ENCOUNTER — TELEPHONE (OUTPATIENT)
Dept: NEUROLOGY | Facility: CLINIC | Age: 65
End: 2025-07-01
Payer: MEDICARE

## 2025-07-01 NOTE — TELEPHONE ENCOUNTER
Attempted to reach patient to remind them about appointment scheduled with Irene Pina PA-C on 7/2/25 in our [place] clinic.    A voicemail was left with a call back number if the patient has questions or would like to reschedule.

## 2025-07-02 ENCOUNTER — OFFICE VISIT (OUTPATIENT)
Dept: NEUROLOGY | Facility: CLINIC | Age: 65
End: 2025-07-02
Attending: PHYSICIAN ASSISTANT
Payer: MEDICARE

## 2025-07-02 VITALS — OXYGEN SATURATION: 97 % | SYSTOLIC BLOOD PRESSURE: 111 MMHG | DIASTOLIC BLOOD PRESSURE: 72 MMHG | HEART RATE: 53 BPM

## 2025-07-02 DIAGNOSIS — M79.2 NEURALGIA: ICD-10-CM

## 2025-07-02 DIAGNOSIS — M54.81 BILATERAL OCCIPITAL NEURALGIA: ICD-10-CM

## 2025-07-02 DIAGNOSIS — G43.E19 INTRACTABLE CHRONIC MIGRAINE WITH AURA AND WITHOUT STATUS MIGRAINOSUS: Primary | ICD-10-CM

## 2025-07-02 DIAGNOSIS — I67.841 REVERSIBLE CEREBROVASCULAR VASOCONSTRICTION SYNDROME: ICD-10-CM

## 2025-07-02 DIAGNOSIS — G44.86 CERVICOGENIC HEADACHE: ICD-10-CM

## 2025-07-02 DIAGNOSIS — G44.321 INTRACTABLE CHRONIC POST-TRAUMATIC HEADACHE: ICD-10-CM

## 2025-07-02 PROCEDURE — 99204 OFFICE O/P NEW MOD 45 MIN: CPT

## 2025-07-02 PROCEDURE — 3078F DIAST BP <80 MM HG: CPT

## 2025-07-02 PROCEDURE — 3074F SYST BP LT 130 MM HG: CPT

## 2025-07-02 PROCEDURE — 1125F AMNT PAIN NOTED PAIN PRSNT: CPT

## 2025-07-02 ASSESSMENT — HEADACHE IMPACT TEST (HIT 6)
WHEN YOU HAVE A HEADACHE HOW OFTEN DO YOU WISH YOU COULD LIE DOWN: ALWAYS
HOW OFTEN HAVE YOU FELT FED UP OR IRRITATED BECAUSE OF YOUR HEADACHES: SOMETIMES
HOW OFTEN DID HEADACHS LIMIT CONCENTRATION ON WORK OR DAILY ACTIVITY: VERY OFTEN
HOW OFTEN HAVE YOU FELT TOO TIRED TO WORK BECAUSE OF YOUR HEADACHES: ALWAYS
WHEN YOU HAVE HEADACHES HOW OFTEN IS THE PAIN SEVERE: VERY OFTEN
HIT6 TOTAL SCORE: 71
HOW OFTEN DO HEADACHES LIMIT YOUR DAILY ACTIVITIES: ALWAYS

## 2025-07-02 ASSESSMENT — MIGRAINE DISABILITY ASSESSMENT (MIDAS)
HOW MANY DAYS DID YOU MISS WORK OR SCHOOL BECAUSE OF HEADACHES: 90
HOW MANY DAYS DID YOU NOT DO HOUSEWORK BECAUSE OF HEADACHES: 45
TOTAL SCORE: 360
HOW MANY DAYS IN THE PAST 3 MONTHS HAVE YOU HAD A HEADACHE: 90
HOW OFTEN WERE SOCIAL ACTIVITIES MISSED DUE TO HEADACHES: 45
ON A SCALE FROM 0-10 ON AVERAGE HOW PAINFUL WERE HEADACHES: 9
HOW MANY DAYS WAS YOUR PRODUCTIVITY CUT IN HALF BECAUSE OF HEADACHES: 90
HOW MANY DAYS WAS HOUSEWORK PRODUCTIVITY CUT IN HALF DUE TO HEADACHES: 90

## 2025-07-02 ASSESSMENT — PAIN SCALES - GENERAL: PAINLEVEL_OUTOF10: SEVERE PAIN (7)

## 2025-07-02 NOTE — NURSING NOTE
"Dez Mullen is a 64 year old male who presents for:  Chief Complaint   Patient presents with    Headache     Intractable chronic post-traumatic headache, ref by ZEINAB ACEVEDO recs in epic, scheduled per wife Chioma        Initial Vitals:  /72   Pulse 53   SpO2 97%  Estimated body mass index is 28.84 kg/m  as calculated from the following:    Height as of 11/12/24: 1.778 m (5' 10\").    Weight as of 11/12/24: 91.2 kg (201 lb).. There is no height or weight on file to calculate BSA. BP completed using cuff size: large  Severe Pain (7)    Nursing Comments:     Felecia Hoffman MA   "
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
## 2025-07-22 ENCOUNTER — TELEPHONE (OUTPATIENT)
Dept: FAMILY MEDICINE | Facility: CLINIC | Age: 65
End: 2025-07-22
Payer: MEDICARE

## 2025-07-22 NOTE — TELEPHONE ENCOUNTER
RN called patient and LVM to call clinic at [phone].      If patient calls back, please relay provider message below.     Jocelynn Higgins RN       ----- Message from Awa Rowe sent at 7/22/2025 10:40 AM CDT -----  Regarding: Disability form clarification  Hi Team,    Can you call this patient? I have never met this patient and he has an appointment with me on 7/24 for disability form completion. Can you clarify what forms he is wanting me to complete? I can complete SNAP forms and/or FMLA, however complete disability forms would need to be completed by his specialist or occupational medicine.    Thanks!    Awa Rowe PA-C on 7/22/2025 at 10:42 AM

## 2025-07-23 NOTE — TELEPHONE ENCOUNTER
Second attempt. Left message and sent MyChart message per Awa Rowe PA-C:      ----- Message from Awa Rowe sent at 7/22/2025 10:40 AM CDT -----  Regarding: Disability form clarification  Hi Team,     Can you call this patient? I have never met this patient and he has an appointment with me on 7/24 for disability form completion. Can you clarify what forms he is wanting me to complete? I can complete SNAP forms and/or FMLA, however complete disability forms would need to be completed by his specialist or occupational medicine.     Thanks!     Awa Rowe PA-C on 7/22/2025 at 10:42 AM    RN, if/when patient returns call, please review message as shown. Molly Carmona, RN, BSN, PHN

## 2025-07-24 ENCOUNTER — OFFICE VISIT (OUTPATIENT)
Dept: FAMILY MEDICINE | Facility: CLINIC | Age: 65
End: 2025-07-24
Payer: MEDICARE

## 2025-07-24 VITALS
WEIGHT: 199 LBS | HEIGHT: 71 IN | HEART RATE: 60 BPM | SYSTOLIC BLOOD PRESSURE: 124 MMHG | RESPIRATION RATE: 16 BRPM | OXYGEN SATURATION: 96 % | DIASTOLIC BLOOD PRESSURE: 76 MMHG | TEMPERATURE: 97.5 F | BODY MASS INDEX: 27.86 KG/M2

## 2025-07-24 DIAGNOSIS — Z02.89 ENCOUNTER FOR COMPLETION OF FORM WITH PATIENT: Primary | ICD-10-CM

## 2025-07-24 ASSESSMENT — PAIN SCALES - GENERAL: PAINLEVEL_OUTOF10: MODERATE PAIN (5)

## 2025-07-24 NOTE — TELEPHONE ENCOUNTER
Will discuss with patient if he comes to scheduled appointment.  Awa Rowe PA-C on 7/24/2025 at 12:07 PM

## 2025-07-24 NOTE — PROGRESS NOTES
"I was called to the  by our team asking me to assist with patient check in. When I arrived to , patient and wife were standing at desk. I tried to introduce myself to explain my name/role. Patient immediately started talking before I could introduce myself. He stated \"I don't have to provide that information\". When I asked our patient registration staff member what information was being asked, she showed me the communicable disease screening questions on the EPIC screen. I tried to explain to the patient and his wife these questions are standard for all patients and we ask our communicable disease screening questions to keep our patients and our staff safe. He cut me off and said \"my travel is my information and my business\". I did explain that our staff is required to ask our screening questions but patients may refuse to provide answers at any time. He said \"thank you for understanding\". I directed the patient registration staff to luna \"unable to assess\" for the screening questions.     Patient registration staff member than stated patient refused to validate date of birth and address. Patient stated \"why do you need all of this information, you are the information gathering department now and you have to ask me all of these ridiculous questions\". I explained for patient safety and verification we are required to validate patient name, date of birth and address. Patient said \"it's not like people are going to walk in here and try to impersonate me\". Patient also stated \"I worked in healthcare for 14 years, I know what information you need, and you are now the information gathering department, you asked me if I lived in a nursing home once\".     I responded by introducing myself by name and my role as the clinic manager. I explained to the patient that unfortunately health systems do experience fraudulent situations and explained why we are required to ask for this information. I did ask if we " "could see patient identification card if he wasn't willing to verbally confirm the information. Patient did give us his drivers' license so we could validate his name, date of birth and address information. His wife stated \"we already verified his birthday\" when he handed the ID card over.     Patient/wife sat down in [facility]. Care team approached me stating they had tried to contact patient previously to discuss provider questions regarding visit/form. I approached patient/wife in [facility]. I approached patient/wife and explained the care team had tried to reach them via phone to gather some information about the appointment in advance but had been unable to reach them. I explained the listed reason for the visit was \"total disability form\" and the provider typically would not fill this kind of form out. The patient stood up and yelled \"I knew we shouldn't have switched to [place]!\". He then walked out. Pt's wife started yelling at me. She said \"you don't understand, you don't understand how important this visit is\". I responded and said \"you are correct, I am not in your shoes so I won't say I understand, but I respect how you are feeling and I am trying to help provide information. The provider will still see you today but we were hoping to gather information before you came to the visit in order to clarify the visit today.\" The pt's wife stated \"I told them it's not for total disability\". I then clarified \"what is listed on your appointment notes is total disability paperwork, I'm sorry if there was confusion. The provider can see you today to discuss a form, but may not be able to fill out a total disability form.\" The pt's wife said \"ok then let's get him seen\".     I let her know I would alert the rooming team and the provider.     See provider note for more detail.   "
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
## 2025-07-24 NOTE — TELEPHONE ENCOUNTER
Third attempt. [employer] message not read as of 0826 7/24/25. Left message for patient to return call to clinic as requested by Awa Rowe PA-C:    ----- Message from Awa Rowe sent at 7/22/2025 10:40 AM CDT -----  Regarding: Disability form clarification  Hi Team,     Can you call this patient? I have never met this patient and he has an appointment with me on 7/24 for disability form completion. Can you clarify what forms he is wanting me to complete? I can complete SNAP forms and/or FMLA, however complete disability forms would need to be completed by his specialist or occupational medicine.     Thanks!     Awa Rowe PA-C on 7/22/2025 at 10:42 AM    Multiple attempts have been made to reach the patient about this request prior to his 1330 appointment today. Routing to provider, how would you like us to proceed with this? Molly Carmona, RN, BSN, PHN

## 2025-07-29 ENCOUNTER — ANCILLARY PROCEDURE (OUTPATIENT)
Dept: MRI IMAGING | Facility: CLINIC | Age: 65
End: 2025-07-29
Payer: MEDICARE

## 2025-07-29 DIAGNOSIS — G43.E19 INTRACTABLE CHRONIC MIGRAINE WITH AURA AND WITHOUT STATUS MIGRAINOSUS: ICD-10-CM

## 2025-07-29 DIAGNOSIS — G44.86 CERVICOGENIC HEADACHE: ICD-10-CM

## 2025-07-29 DIAGNOSIS — M54.81 BILATERAL OCCIPITAL NEURALGIA: ICD-10-CM

## 2025-07-29 DIAGNOSIS — M79.2 NEURALGIA: ICD-10-CM

## 2025-07-29 DIAGNOSIS — G44.321 INTRACTABLE CHRONIC POST-TRAUMATIC HEADACHE: ICD-10-CM

## 2025-07-29 DIAGNOSIS — I67.841 REVERSIBLE CEREBROVASCULAR VASOCONSTRICTION SYNDROME: ICD-10-CM

## 2025-07-29 LAB
RADIOLOGIST FLAGS: ABNORMAL

## 2025-07-29 PROCEDURE — 70544 MR ANGIOGRAPHY HEAD W/O DYE: CPT | Mod: TC | Performed by: RADIOLOGY

## 2025-07-29 PROCEDURE — 70553 MRI BRAIN STEM W/O & W/DYE: CPT | Mod: TC | Performed by: RADIOLOGY

## 2025-07-29 PROCEDURE — 70549 MR ANGIOGRAPH NECK W/O&W/DYE: CPT | Mod: TC | Performed by: RADIOLOGY

## 2025-07-29 PROCEDURE — A9585 GADOBUTROL INJECTION: HCPCS | Mod: JW | Performed by: RADIOLOGY

## 2025-07-29 RX ORDER — GADOBUTROL 604.72 MG/ML
0.1 INJECTION INTRAVENOUS ONCE
Status: COMPLETED | OUTPATIENT
Start: 2025-07-29 | End: 2025-07-29

## 2025-07-29 RX ADMIN — GADOBUTROL 10 ML: 604.72 INJECTION INTRAVENOUS at 16:24

## 2025-07-31 ENCOUNTER — TRANSFERRED RECORDS (OUTPATIENT)
Dept: HEALTH INFORMATION MANAGEMENT | Facility: CLINIC | Age: 65
End: 2025-07-31
Payer: MEDICARE

## 2025-08-28 ENCOUNTER — TRANSFERRED RECORDS (OUTPATIENT)
Dept: HEALTH INFORMATION MANAGEMENT | Facility: CLINIC | Age: 65
End: 2025-08-28
Payer: MEDICARE